# Patient Record
Sex: MALE | Race: BLACK OR AFRICAN AMERICAN | NOT HISPANIC OR LATINO | URBAN - METROPOLITAN AREA
[De-identification: names, ages, dates, MRNs, and addresses within clinical notes are randomized per-mention and may not be internally consistent; named-entity substitution may affect disease eponyms.]

---

## 2018-01-01 ENCOUNTER — INPATIENT (INPATIENT)
Facility: HOSPITAL | Age: 48
LOS: 6 days | DRG: 283 | End: 2018-06-29
Attending: STUDENT IN AN ORGANIZED HEALTH CARE EDUCATION/TRAINING PROGRAM | Admitting: INTERNAL MEDICINE
Payer: MEDICAID

## 2018-01-01 VITALS
OXYGEN SATURATION: 94 % | DIASTOLIC BLOOD PRESSURE: 99 MMHG | RESPIRATION RATE: 25 BRPM | SYSTOLIC BLOOD PRESSURE: 154 MMHG

## 2018-01-01 DIAGNOSIS — Z78.9 OTHER SPECIFIED HEALTH STATUS: Chronic | ICD-10-CM

## 2018-01-01 DIAGNOSIS — G93.1 ANOXIC BRAIN DAMAGE, NOT ELSEWHERE CLASSIFIED: ICD-10-CM

## 2018-01-01 DIAGNOSIS — Z71.89 OTHER SPECIFIED COUNSELING: ICD-10-CM

## 2018-01-01 DIAGNOSIS — I46.9 CARDIAC ARREST, CAUSE UNSPECIFIED: ICD-10-CM

## 2018-01-01 DIAGNOSIS — Z51.5 ENCOUNTER FOR PALLIATIVE CARE: ICD-10-CM

## 2018-01-01 LAB
ALBUMIN SERPL ELPH-MCNC: 3.3 G/DL — SIGNIFICANT CHANGE UP (ref 3.3–5)
ALBUMIN SERPL ELPH-MCNC: 3.4 G/DL — SIGNIFICANT CHANGE UP (ref 3.3–5)
ALBUMIN SERPL ELPH-MCNC: 3.5 G/DL — SIGNIFICANT CHANGE UP (ref 3.3–5)
ALBUMIN SERPL ELPH-MCNC: 3.6 G/DL — SIGNIFICANT CHANGE UP (ref 3.3–5)
ALBUMIN SERPL ELPH-MCNC: 3.7 G/DL — SIGNIFICANT CHANGE UP (ref 3.3–5)
ALBUMIN SERPL ELPH-MCNC: 3.8 G/DL — SIGNIFICANT CHANGE UP (ref 3.3–5)
ALBUMIN SERPL ELPH-MCNC: 3.8 G/DL — SIGNIFICANT CHANGE UP (ref 3.3–5)
ALBUMIN SERPL ELPH-MCNC: 3.9 G/DL — SIGNIFICANT CHANGE UP (ref 3.3–5)
ALBUMIN SERPL ELPH-MCNC: 3.9 G/DL — SIGNIFICANT CHANGE UP (ref 3.3–5)
ALBUMIN SERPL ELPH-MCNC: 4.1 G/DL — SIGNIFICANT CHANGE UP (ref 3.3–5)
ALBUMIN SERPL ELPH-MCNC: 4.3 G/DL — SIGNIFICANT CHANGE UP (ref 3.3–5)
ALP SERPL-CCNC: 103 U/L — SIGNIFICANT CHANGE UP (ref 40–120)
ALP SERPL-CCNC: 107 U/L — SIGNIFICANT CHANGE UP (ref 40–120)
ALP SERPL-CCNC: 73 U/L — SIGNIFICANT CHANGE UP (ref 40–120)
ALP SERPL-CCNC: 74 U/L — SIGNIFICANT CHANGE UP (ref 40–120)
ALP SERPL-CCNC: 75 U/L — SIGNIFICANT CHANGE UP (ref 40–120)
ALP SERPL-CCNC: 77 U/L — SIGNIFICANT CHANGE UP (ref 40–120)
ALP SERPL-CCNC: 77 U/L — SIGNIFICANT CHANGE UP (ref 40–120)
ALP SERPL-CCNC: 78 U/L — SIGNIFICANT CHANGE UP (ref 40–120)
ALP SERPL-CCNC: 81 U/L — SIGNIFICANT CHANGE UP (ref 40–120)
ALP SERPL-CCNC: 84 U/L — SIGNIFICANT CHANGE UP (ref 40–120)
ALP SERPL-CCNC: 87 U/L — SIGNIFICANT CHANGE UP (ref 40–120)
ALP SERPL-CCNC: 87 U/L — SIGNIFICANT CHANGE UP (ref 40–120)
ALP SERPL-CCNC: 91 U/L — SIGNIFICANT CHANGE UP (ref 40–120)
ALP SERPL-CCNC: 95 U/L — SIGNIFICANT CHANGE UP (ref 40–120)
ALP SERPL-CCNC: 96 U/L — SIGNIFICANT CHANGE UP (ref 40–120)
ALP SERPL-CCNC: 99 U/L — SIGNIFICANT CHANGE UP (ref 40–120)
ALT FLD-CCNC: 54 U/L — HIGH (ref 10–45)
ALT FLD-CCNC: 60 U/L — HIGH (ref 10–45)
ALT FLD-CCNC: 61 U/L — HIGH (ref 10–45)
ALT FLD-CCNC: 61 U/L — HIGH (ref 10–45)
ALT FLD-CCNC: 63 U/L — HIGH (ref 10–45)
ALT FLD-CCNC: 63 U/L — HIGH (ref 10–45)
ALT FLD-CCNC: 72 U/L — HIGH (ref 10–45)
ALT FLD-CCNC: 79 U/L — HIGH (ref 10–45)
ALT FLD-CCNC: 79 U/L — HIGH (ref 10–45)
ALT FLD-CCNC: 81 U/L — HIGH (ref 10–45)
ALT FLD-CCNC: 82 U/L — HIGH (ref 10–45)
ALT FLD-CCNC: 83 U/L — HIGH (ref 10–45)
ALT FLD-CCNC: 90 U/L — HIGH (ref 10–45)
ALT FLD-CCNC: 91 U/L — HIGH (ref 10–45)
ALT FLD-CCNC: 94 U/L — HIGH (ref 10–45)
ALT FLD-CCNC: 99 U/L — HIGH (ref 10–45)
AMPHET UR-MCNC: NEGATIVE — SIGNIFICANT CHANGE UP
AMYLASE P1 CFR SERPL: 153 U/L — HIGH (ref 25–125)
AMYLASE P1 CFR SERPL: 382 U/L — HIGH (ref 25–125)
AMYLASE P1 CFR SERPL: 97 U/L — SIGNIFICANT CHANGE UP (ref 25–125)
ANION GAP SERPL CALC-SCNC: 12 MMOL/L — SIGNIFICANT CHANGE UP (ref 5–17)
ANION GAP SERPL CALC-SCNC: 13 MMOL/L — SIGNIFICANT CHANGE UP (ref 5–17)
ANION GAP SERPL CALC-SCNC: 14 MMOL/L — SIGNIFICANT CHANGE UP (ref 5–17)
ANION GAP SERPL CALC-SCNC: 15 MMOL/L — SIGNIFICANT CHANGE UP (ref 5–17)
ANION GAP SERPL CALC-SCNC: 16 MMOL/L — SIGNIFICANT CHANGE UP (ref 5–17)
APPEARANCE UR: CLEAR — SIGNIFICANT CHANGE UP
APTT BLD: 28.8 SEC — SIGNIFICANT CHANGE UP (ref 27.5–37.4)
APTT BLD: 47.2 SEC — HIGH (ref 27.5–37.4)
APTT BLD: 51.8 SEC — HIGH (ref 27.5–37.4)
APTT BLD: 53.2 SEC — HIGH (ref 27.5–37.4)
APTT BLD: 55.5 SEC — HIGH (ref 27.5–37.4)
APTT BLD: 58.1 SEC — HIGH (ref 27.5–37.4)
APTT BLD: 61.1 SEC — HIGH (ref 27.5–37.4)
APTT BLD: 75.4 SEC — HIGH (ref 27.5–37.4)
APTT BLD: 82.2 SEC — HIGH (ref 27.5–37.4)
AST SERPL-CCNC: 103 U/L — HIGH (ref 10–40)
AST SERPL-CCNC: 104 U/L — HIGH (ref 10–40)
AST SERPL-CCNC: 104 U/L — HIGH (ref 10–40)
AST SERPL-CCNC: 108 U/L — HIGH (ref 10–40)
AST SERPL-CCNC: 111 U/L — HIGH (ref 10–40)
AST SERPL-CCNC: 140 U/L — HIGH (ref 10–40)
AST SERPL-CCNC: 147 U/L — HIGH (ref 10–40)
AST SERPL-CCNC: 165 U/L — HIGH (ref 10–40)
AST SERPL-CCNC: 64 U/L — HIGH (ref 10–40)
AST SERPL-CCNC: 72 U/L — HIGH (ref 10–40)
AST SERPL-CCNC: 75 U/L — HIGH (ref 10–40)
AST SERPL-CCNC: 77 U/L — HIGH (ref 10–40)
AST SERPL-CCNC: 79 U/L — HIGH (ref 10–40)
AST SERPL-CCNC: 81 U/L — HIGH (ref 10–40)
AST SERPL-CCNC: 91 U/L — HIGH (ref 10–40)
AST SERPL-CCNC: 96 U/L — HIGH (ref 10–40)
BARBITURATES, URINE.: NEGATIVE — SIGNIFICANT CHANGE UP
BASOPHILS # BLD AUTO: 0 K/UL — SIGNIFICANT CHANGE UP (ref 0–0.2)
BASOPHILS # BLD AUTO: 0.1 K/UL — SIGNIFICANT CHANGE UP (ref 0–0.2)
BASOPHILS NFR BLD AUTO: 0.1 % — SIGNIFICANT CHANGE UP (ref 0–2)
BASOPHILS NFR BLD AUTO: 0.2 % — SIGNIFICANT CHANGE UP (ref 0–2)
BASOPHILS NFR BLD AUTO: 0.3 % — SIGNIFICANT CHANGE UP (ref 0–2)
BASOPHILS NFR BLD AUTO: 0.5 % — SIGNIFICANT CHANGE UP (ref 0–2)
BENZODIAZ UR-MCNC: NEGATIVE — SIGNIFICANT CHANGE UP
BILIRUB SERPL-MCNC: 0.1 MG/DL — LOW (ref 0.2–1.2)
BILIRUB SERPL-MCNC: 0.2 MG/DL — SIGNIFICANT CHANGE UP (ref 0.2–1.2)
BILIRUB SERPL-MCNC: 0.3 MG/DL — SIGNIFICANT CHANGE UP (ref 0.2–1.2)
BILIRUB SERPL-MCNC: 0.4 MG/DL — SIGNIFICANT CHANGE UP (ref 0.2–1.2)
BILIRUB UR-MCNC: NEGATIVE — SIGNIFICANT CHANGE UP
BLD GP AB SCN SERPL QL: NEGATIVE — SIGNIFICANT CHANGE UP
BUN SERPL-MCNC: 15 MG/DL — SIGNIFICANT CHANGE UP (ref 7–23)
BUN SERPL-MCNC: 17 MG/DL — SIGNIFICANT CHANGE UP (ref 7–23)
BUN SERPL-MCNC: 17 MG/DL — SIGNIFICANT CHANGE UP (ref 7–23)
BUN SERPL-MCNC: 18 MG/DL — SIGNIFICANT CHANGE UP (ref 7–23)
BUN SERPL-MCNC: 19 MG/DL — SIGNIFICANT CHANGE UP (ref 7–23)
BUN SERPL-MCNC: 20 MG/DL — SIGNIFICANT CHANGE UP (ref 7–23)
BUN SERPL-MCNC: 20 MG/DL — SIGNIFICANT CHANGE UP (ref 7–23)
BUN SERPL-MCNC: 22 MG/DL — SIGNIFICANT CHANGE UP (ref 7–23)
BUN SERPL-MCNC: 24 MG/DL — HIGH (ref 7–23)
BUN SERPL-MCNC: 25 MG/DL — HIGH (ref 7–23)
BUN SERPL-MCNC: 28 MG/DL — HIGH (ref 7–23)
BUN SERPL-MCNC: 37 MG/DL — HIGH (ref 7–23)
BUN SERPL-MCNC: 41 MG/DL — HIGH (ref 7–23)
CALCIUM SERPL-MCNC: 7.5 MG/DL — LOW (ref 8.4–10.5)
CALCIUM SERPL-MCNC: 8 MG/DL — LOW (ref 8.4–10.5)
CALCIUM SERPL-MCNC: 8.1 MG/DL — LOW (ref 8.4–10.5)
CALCIUM SERPL-MCNC: 8.2 MG/DL — LOW (ref 8.4–10.5)
CALCIUM SERPL-MCNC: 8.4 MG/DL — SIGNIFICANT CHANGE UP (ref 8.4–10.5)
CALCIUM SERPL-MCNC: 8.5 MG/DL — SIGNIFICANT CHANGE UP (ref 8.4–10.5)
CALCIUM SERPL-MCNC: 8.6 MG/DL — SIGNIFICANT CHANGE UP (ref 8.4–10.5)
CALCIUM SERPL-MCNC: 9.2 MG/DL — SIGNIFICANT CHANGE UP (ref 8.4–10.5)
CALCIUM SERPL-MCNC: 9.2 MG/DL — SIGNIFICANT CHANGE UP (ref 8.4–10.5)
CALCIUM SERPL-MCNC: 9.5 MG/DL — SIGNIFICANT CHANGE UP (ref 8.4–10.5)
CALCIUM SERPL-MCNC: 9.6 MG/DL — SIGNIFICANT CHANGE UP (ref 8.4–10.5)
CALCIUM SERPL-MCNC: 9.6 MG/DL — SIGNIFICANT CHANGE UP (ref 8.4–10.5)
CHLORIDE SERPL-SCNC: 100 MMOL/L — SIGNIFICANT CHANGE UP (ref 96–108)
CHLORIDE SERPL-SCNC: 101 MMOL/L — SIGNIFICANT CHANGE UP (ref 96–108)
CHLORIDE SERPL-SCNC: 102 MMOL/L — SIGNIFICANT CHANGE UP (ref 96–108)
CHLORIDE SERPL-SCNC: 103 MMOL/L — SIGNIFICANT CHANGE UP (ref 96–108)
CHLORIDE SERPL-SCNC: 104 MMOL/L — SIGNIFICANT CHANGE UP (ref 96–108)
CHLORIDE SERPL-SCNC: 105 MMOL/L — SIGNIFICANT CHANGE UP (ref 96–108)
CHLORIDE SERPL-SCNC: 109 MMOL/L — HIGH (ref 96–108)
CHOLEST SERPL-MCNC: 134 MG/DL — SIGNIFICANT CHANGE UP (ref 10–199)
CK MB BLD-MCNC: 1.4 % — SIGNIFICANT CHANGE UP (ref 0–3.5)
CK MB BLD-MCNC: 1.6 % — SIGNIFICANT CHANGE UP (ref 0–3.5)
CK MB BLD-MCNC: 1.9 % — SIGNIFICANT CHANGE UP (ref 0–3.5)
CK MB BLD-MCNC: 2.1 % — SIGNIFICANT CHANGE UP (ref 0–3.5)
CK MB CFR SERPL CALC: 52.7 NG/ML — HIGH (ref 0–6.7)
CK MB CFR SERPL CALC: 6.3 NG/ML — SIGNIFICANT CHANGE UP (ref 0–6.7)
CK MB CFR SERPL CALC: 61.7 NG/ML — HIGH (ref 0–6.7)
CK MB CFR SERPL CALC: 65.5 NG/ML — HIGH (ref 0–6.7)
CK SERPL-CCNC: 2970 U/L — HIGH (ref 30–200)
CK SERPL-CCNC: 3290 U/L — HIGH (ref 30–200)
CK SERPL-CCNC: 3386 U/L — HIGH (ref 30–200)
CK SERPL-CCNC: 445 U/L — HIGH (ref 30–200)
CO2 SERPL-SCNC: 23 MMOL/L — SIGNIFICANT CHANGE UP (ref 22–31)
CO2 SERPL-SCNC: 24 MMOL/L — SIGNIFICANT CHANGE UP (ref 22–31)
CO2 SERPL-SCNC: 25 MMOL/L — SIGNIFICANT CHANGE UP (ref 22–31)
CO2 SERPL-SCNC: 26 MMOL/L — SIGNIFICANT CHANGE UP (ref 22–31)
CO2 SERPL-SCNC: 27 MMOL/L — SIGNIFICANT CHANGE UP (ref 22–31)
CO2 SERPL-SCNC: 27 MMOL/L — SIGNIFICANT CHANGE UP (ref 22–31)
CO2 SERPL-SCNC: 29 MMOL/L — SIGNIFICANT CHANGE UP (ref 22–31)
CO2 SERPL-SCNC: 30 MMOL/L — SIGNIFICANT CHANGE UP (ref 22–31)
COCAINE METAB.OTHER UR-MCNC: NEGATIVE — SIGNIFICANT CHANGE UP
COLOR SPEC: YELLOW — SIGNIFICANT CHANGE UP
CREAT SERPL-MCNC: 0.93 MG/DL — SIGNIFICANT CHANGE UP (ref 0.5–1.3)
CREAT SERPL-MCNC: 0.95 MG/DL — SIGNIFICANT CHANGE UP (ref 0.5–1.3)
CREAT SERPL-MCNC: 1 MG/DL — SIGNIFICANT CHANGE UP (ref 0.5–1.3)
CREAT SERPL-MCNC: 1.04 MG/DL — SIGNIFICANT CHANGE UP (ref 0.5–1.3)
CREAT SERPL-MCNC: 1.08 MG/DL — SIGNIFICANT CHANGE UP (ref 0.5–1.3)
CREAT SERPL-MCNC: 1.11 MG/DL — SIGNIFICANT CHANGE UP (ref 0.5–1.3)
CREAT SERPL-MCNC: 1.13 MG/DL — SIGNIFICANT CHANGE UP (ref 0.5–1.3)
CREAT SERPL-MCNC: 1.16 MG/DL — SIGNIFICANT CHANGE UP (ref 0.5–1.3)
CREAT SERPL-MCNC: 1.17 MG/DL — SIGNIFICANT CHANGE UP (ref 0.5–1.3)
CREAT SERPL-MCNC: 1.24 MG/DL — SIGNIFICANT CHANGE UP (ref 0.5–1.3)
CREAT SERPL-MCNC: 1.27 MG/DL — SIGNIFICANT CHANGE UP (ref 0.5–1.3)
CREAT SERPL-MCNC: 1.31 MG/DL — HIGH (ref 0.5–1.3)
CREAT SERPL-MCNC: 1.52 MG/DL — HIGH (ref 0.5–1.3)
CREAT SERPL-MCNC: 1.54 MG/DL — HIGH (ref 0.5–1.3)
CREATININE, URINE THERAPEUTIC: 9.9 MG/DL — LOW
CULTURE RESULTS: NO GROWTH — SIGNIFICANT CHANGE UP
CULTURE RESULTS: NO GROWTH — SIGNIFICANT CHANGE UP
CULTURE RESULTS: SIGNIFICANT CHANGE UP
CULTURE RESULTS: SIGNIFICANT CHANGE UP
DIFF PNL FLD: NEGATIVE — SIGNIFICANT CHANGE UP
EOSINOPHIL # BLD AUTO: 0 K/UL — SIGNIFICANT CHANGE UP (ref 0–0.5)
EOSINOPHIL # BLD AUTO: 0.1 K/UL — SIGNIFICANT CHANGE UP (ref 0–0.5)
EOSINOPHIL # BLD AUTO: 0.1 K/UL — SIGNIFICANT CHANGE UP (ref 0–0.5)
EOSINOPHIL NFR BLD AUTO: 0 % — SIGNIFICANT CHANGE UP (ref 0–6)
EOSINOPHIL NFR BLD AUTO: 0.1 % — SIGNIFICANT CHANGE UP (ref 0–6)
EOSINOPHIL NFR BLD AUTO: 0.1 % — SIGNIFICANT CHANGE UP (ref 0–6)
EOSINOPHIL NFR BLD AUTO: 0.2 % — SIGNIFICANT CHANGE UP (ref 0–6)
EOSINOPHIL NFR BLD AUTO: 0.2 % — SIGNIFICANT CHANGE UP (ref 0–6)
EOSINOPHIL NFR BLD AUTO: 0.6 % — SIGNIFICANT CHANGE UP (ref 0–6)
FIBRINOGEN PPP-MCNC: 684 MG/DL — HIGH (ref 310–510)
FIBRINOGEN PPP-MCNC: 934 MG/DL — HIGH (ref 310–510)
FIBRINOGEN PPP-MCNC: 970 MG/DL — HIGH (ref 310–510)
FIBRINOGEN PPP-MCNC: 984 MG/DL — HIGH (ref 310–510)
GAS PNL BLDA: SIGNIFICANT CHANGE UP
GLUCOSE SERPL-MCNC: 106 MG/DL — HIGH (ref 70–99)
GLUCOSE SERPL-MCNC: 112 MG/DL — HIGH (ref 70–99)
GLUCOSE SERPL-MCNC: 113 MG/DL — HIGH (ref 70–99)
GLUCOSE SERPL-MCNC: 115 MG/DL — HIGH (ref 70–99)
GLUCOSE SERPL-MCNC: 117 MG/DL — HIGH (ref 70–99)
GLUCOSE SERPL-MCNC: 120 MG/DL — HIGH (ref 70–99)
GLUCOSE SERPL-MCNC: 122 MG/DL — HIGH (ref 70–99)
GLUCOSE SERPL-MCNC: 133 MG/DL — HIGH (ref 70–99)
GLUCOSE SERPL-MCNC: 136 MG/DL — HIGH (ref 70–99)
GLUCOSE SERPL-MCNC: 140 MG/DL — HIGH (ref 70–99)
GLUCOSE SERPL-MCNC: 151 MG/DL — HIGH (ref 70–99)
GLUCOSE SERPL-MCNC: 154 MG/DL — HIGH (ref 70–99)
GLUCOSE SERPL-MCNC: 165 MG/DL — HIGH (ref 70–99)
GLUCOSE SERPL-MCNC: 166 MG/DL — HIGH (ref 70–99)
GLUCOSE SERPL-MCNC: 204 MG/DL — HIGH (ref 70–99)
GLUCOSE SERPL-MCNC: 221 MG/DL — HIGH (ref 70–99)
GLUCOSE UR QL: NEGATIVE — SIGNIFICANT CHANGE UP
HBA1C BLD-MCNC: 6.3 % — HIGH (ref 4–5.6)
HCT VFR BLD CALC: 37.4 % — LOW (ref 39–50)
HCT VFR BLD CALC: 38.3 % — LOW (ref 39–50)
HCT VFR BLD CALC: 39.5 % — SIGNIFICANT CHANGE UP (ref 39–50)
HCT VFR BLD CALC: 39.7 % — SIGNIFICANT CHANGE UP (ref 39–50)
HCT VFR BLD CALC: 40.1 % — SIGNIFICANT CHANGE UP (ref 39–50)
HCT VFR BLD CALC: 40.3 % — SIGNIFICANT CHANGE UP (ref 39–50)
HCT VFR BLD CALC: 40.6 % — SIGNIFICANT CHANGE UP (ref 39–50)
HCT VFR BLD CALC: 40.7 % — SIGNIFICANT CHANGE UP (ref 39–50)
HCT VFR BLD CALC: 41.1 % — SIGNIFICANT CHANGE UP (ref 39–50)
HCT VFR BLD CALC: 41.3 % — SIGNIFICANT CHANGE UP (ref 39–50)
HCT VFR BLD CALC: 41.6 % — SIGNIFICANT CHANGE UP (ref 39–50)
HCT VFR BLD CALC: 42.7 % — SIGNIFICANT CHANGE UP (ref 39–50)
HCT VFR BLD CALC: 42.7 % — SIGNIFICANT CHANGE UP (ref 39–50)
HCT VFR BLD CALC: 43.3 % — SIGNIFICANT CHANGE UP (ref 39–50)
HDLC SERPL-MCNC: 45 MG/DL — SIGNIFICANT CHANGE UP (ref 40–125)
HGB BLD-MCNC: 11.9 G/DL — LOW (ref 13–17)
HGB BLD-MCNC: 12.6 G/DL — LOW (ref 13–17)
HGB BLD-MCNC: 13 G/DL — SIGNIFICANT CHANGE UP (ref 13–17)
HGB BLD-MCNC: 13.1 G/DL — SIGNIFICANT CHANGE UP (ref 13–17)
HGB BLD-MCNC: 13.1 G/DL — SIGNIFICANT CHANGE UP (ref 13–17)
HGB BLD-MCNC: 13.2 G/DL — SIGNIFICANT CHANGE UP (ref 13–17)
HGB BLD-MCNC: 13.5 G/DL — SIGNIFICANT CHANGE UP (ref 13–17)
HGB BLD-MCNC: 13.6 G/DL — SIGNIFICANT CHANGE UP (ref 13–17)
HGB BLD-MCNC: 13.6 G/DL — SIGNIFICANT CHANGE UP (ref 13–17)
HGB BLD-MCNC: 13.7 G/DL — SIGNIFICANT CHANGE UP (ref 13–17)
HGB BLD-MCNC: 13.8 G/DL — SIGNIFICANT CHANGE UP (ref 13–17)
HGB BLD-MCNC: 13.9 G/DL — SIGNIFICANT CHANGE UP (ref 13–17)
INR BLD: 0.96 RATIO — SIGNIFICANT CHANGE UP (ref 0.88–1.16)
INR BLD: 1.01 RATIO — SIGNIFICANT CHANGE UP (ref 0.88–1.16)
INR BLD: 1.05 RATIO — SIGNIFICANT CHANGE UP (ref 0.88–1.16)
INR BLD: 1.07 RATIO — SIGNIFICANT CHANGE UP (ref 0.88–1.16)
INR BLD: 1.07 RATIO — SIGNIFICANT CHANGE UP (ref 0.88–1.16)
INR BLD: 1.08 RATIO — SIGNIFICANT CHANGE UP (ref 0.88–1.16)
INR BLD: 1.09 RATIO — SIGNIFICANT CHANGE UP (ref 0.88–1.16)
INR BLD: 1.1 RATIO — SIGNIFICANT CHANGE UP (ref 0.88–1.16)
INR BLD: 1.1 RATIO — SIGNIFICANT CHANGE UP (ref 0.88–1.16)
INR BLD: 1.11 RATIO — SIGNIFICANT CHANGE UP (ref 0.88–1.16)
INR BLD: 1.11 RATIO — SIGNIFICANT CHANGE UP (ref 0.88–1.16)
KETONES UR-MCNC: NEGATIVE — SIGNIFICANT CHANGE UP
LEUKOCYTE ESTERASE UR-ACNC: NEGATIVE — SIGNIFICANT CHANGE UP
LIPID PNL WITH DIRECT LDL SERPL: SIGNIFICANT CHANGE UP
LYMPHOCYTES # BLD AUTO: 1 K/UL — SIGNIFICANT CHANGE UP (ref 1–3.3)
LYMPHOCYTES # BLD AUTO: 1 K/UL — SIGNIFICANT CHANGE UP (ref 1–3.3)
LYMPHOCYTES # BLD AUTO: 1.2 K/UL — SIGNIFICANT CHANGE UP (ref 1–3.3)
LYMPHOCYTES # BLD AUTO: 1.3 K/UL — SIGNIFICANT CHANGE UP (ref 1–3.3)
LYMPHOCYTES # BLD AUTO: 1.6 K/UL — SIGNIFICANT CHANGE UP (ref 1–3.3)
LYMPHOCYTES # BLD AUTO: 1.6 K/UL — SIGNIFICANT CHANGE UP (ref 1–3.3)
LYMPHOCYTES # BLD AUTO: 10.8 % — LOW (ref 13–44)
LYMPHOCYTES # BLD AUTO: 10.9 % — LOW (ref 13–44)
LYMPHOCYTES # BLD AUTO: 15.6 % — SIGNIFICANT CHANGE UP (ref 13–44)
LYMPHOCYTES # BLD AUTO: 18 % — SIGNIFICANT CHANGE UP (ref 13–44)
LYMPHOCYTES # BLD AUTO: 2.2 K/UL — SIGNIFICANT CHANGE UP (ref 1–3.3)
LYMPHOCYTES # BLD AUTO: 2.4 K/UL — SIGNIFICANT CHANGE UP (ref 1–3.3)
LYMPHOCYTES # BLD AUTO: 4 % — LOW (ref 13–44)
LYMPHOCYTES # BLD AUTO: 8.6 % — LOW (ref 13–44)
LYMPHOCYTES # BLD AUTO: 8.8 % — LOW (ref 13–44)
LYMPHOCYTES # BLD AUTO: 9.2 % — LOW (ref 13–44)
MAGNESIUM SERPL-MCNC: 1.9 MG/DL — SIGNIFICANT CHANGE UP (ref 1.6–2.6)
MAGNESIUM SERPL-MCNC: 2 MG/DL — SIGNIFICANT CHANGE UP (ref 1.6–2.6)
MAGNESIUM SERPL-MCNC: 2 MG/DL — SIGNIFICANT CHANGE UP (ref 1.6–2.6)
MAGNESIUM SERPL-MCNC: 2.1 MG/DL — SIGNIFICANT CHANGE UP (ref 1.6–2.6)
MAGNESIUM SERPL-MCNC: 2.2 MG/DL — SIGNIFICANT CHANGE UP (ref 1.6–2.6)
MAGNESIUM SERPL-MCNC: 2.4 MG/DL — SIGNIFICANT CHANGE UP (ref 1.6–2.6)
MAGNESIUM SERPL-MCNC: 2.5 MG/DL — SIGNIFICANT CHANGE UP (ref 1.6–2.6)
MAGNESIUM SERPL-MCNC: 2.6 MG/DL — SIGNIFICANT CHANGE UP (ref 1.6–2.6)
MCHC RBC-ENTMCNC: 26.7 PG — LOW (ref 27–34)
MCHC RBC-ENTMCNC: 26.9 PG — LOW (ref 27–34)
MCHC RBC-ENTMCNC: 27 PG — SIGNIFICANT CHANGE UP (ref 27–34)
MCHC RBC-ENTMCNC: 27.1 PG — SIGNIFICANT CHANGE UP (ref 27–34)
MCHC RBC-ENTMCNC: 27.2 PG — SIGNIFICANT CHANGE UP (ref 27–34)
MCHC RBC-ENTMCNC: 27.5 PG — SIGNIFICANT CHANGE UP (ref 27–34)
MCHC RBC-ENTMCNC: 27.5 PG — SIGNIFICANT CHANGE UP (ref 27–34)
MCHC RBC-ENTMCNC: 27.7 PG — SIGNIFICANT CHANGE UP (ref 27–34)
MCHC RBC-ENTMCNC: 27.8 PG — SIGNIFICANT CHANGE UP (ref 27–34)
MCHC RBC-ENTMCNC: 27.9 PG — SIGNIFICANT CHANGE UP (ref 27–34)
MCHC RBC-ENTMCNC: 31.9 GM/DL — LOW (ref 32–36)
MCHC RBC-ENTMCNC: 31.9 GM/DL — LOW (ref 32–36)
MCHC RBC-ENTMCNC: 32.1 GM/DL — SIGNIFICANT CHANGE UP (ref 32–36)
MCHC RBC-ENTMCNC: 32.6 GM/DL — SIGNIFICANT CHANGE UP (ref 32–36)
MCHC RBC-ENTMCNC: 32.7 GM/DL — SIGNIFICANT CHANGE UP (ref 32–36)
MCHC RBC-ENTMCNC: 32.7 GM/DL — SIGNIFICANT CHANGE UP (ref 32–36)
MCHC RBC-ENTMCNC: 32.8 GM/DL — SIGNIFICANT CHANGE UP (ref 32–36)
MCHC RBC-ENTMCNC: 32.9 GM/DL — SIGNIFICANT CHANGE UP (ref 32–36)
MCHC RBC-ENTMCNC: 33.1 GM/DL — SIGNIFICANT CHANGE UP (ref 32–36)
MCHC RBC-ENTMCNC: 33.2 GM/DL — SIGNIFICANT CHANGE UP (ref 32–36)
MCHC RBC-ENTMCNC: 33.2 GM/DL — SIGNIFICANT CHANGE UP (ref 32–36)
MCHC RBC-ENTMCNC: 33.3 GM/DL — SIGNIFICANT CHANGE UP (ref 32–36)
MCV RBC AUTO: 81.8 FL — SIGNIFICANT CHANGE UP (ref 80–100)
MCV RBC AUTO: 82.5 FL — SIGNIFICANT CHANGE UP (ref 80–100)
MCV RBC AUTO: 83 FL — SIGNIFICANT CHANGE UP (ref 80–100)
MCV RBC AUTO: 83 FL — SIGNIFICANT CHANGE UP (ref 80–100)
MCV RBC AUTO: 83.1 FL — SIGNIFICANT CHANGE UP (ref 80–100)
MCV RBC AUTO: 83.1 FL — SIGNIFICANT CHANGE UP (ref 80–100)
MCV RBC AUTO: 83.3 FL — SIGNIFICANT CHANGE UP (ref 80–100)
MCV RBC AUTO: 83.6 FL — SIGNIFICANT CHANGE UP (ref 80–100)
MCV RBC AUTO: 83.7 FL — SIGNIFICANT CHANGE UP (ref 80–100)
MCV RBC AUTO: 83.7 FL — SIGNIFICANT CHANGE UP (ref 80–100)
MCV RBC AUTO: 83.9 FL — SIGNIFICANT CHANGE UP (ref 80–100)
MCV RBC AUTO: 84.2 FL — SIGNIFICANT CHANGE UP (ref 80–100)
MCV RBC AUTO: 84.4 FL — SIGNIFICANT CHANGE UP (ref 80–100)
MCV RBC AUTO: 84.7 FL — SIGNIFICANT CHANGE UP (ref 80–100)
METHADONE UR-MCNC: NEGATIVE — SIGNIFICANT CHANGE UP
METHAQUALONE UR QL: NEGATIVE — SIGNIFICANT CHANGE UP
METHAQUALONE UR-MCNC: NEGATIVE — SIGNIFICANT CHANGE UP
MONOCYTES # BLD AUTO: 0.7 K/UL — SIGNIFICANT CHANGE UP (ref 0–0.9)
MONOCYTES # BLD AUTO: 0.9 K/UL — SIGNIFICANT CHANGE UP (ref 0–0.9)
MONOCYTES # BLD AUTO: 1 K/UL — HIGH (ref 0–0.9)
MONOCYTES # BLD AUTO: 1.2 K/UL — HIGH (ref 0–0.9)
MONOCYTES # BLD AUTO: 1.3 K/UL — HIGH (ref 0–0.9)
MONOCYTES # BLD AUTO: 1.8 K/UL — HIGH (ref 0–0.9)
MONOCYTES # BLD AUTO: 1.9 K/UL — HIGH (ref 0–0.9)
MONOCYTES # BLD AUTO: 2 K/UL — HIGH (ref 0–0.9)
MONOCYTES NFR BLD AUTO: 12.1 % — SIGNIFICANT CHANGE UP (ref 2–14)
MONOCYTES NFR BLD AUTO: 13.9 % — SIGNIFICANT CHANGE UP (ref 2–14)
MONOCYTES NFR BLD AUTO: 4 % — SIGNIFICANT CHANGE UP (ref 2–14)
MONOCYTES NFR BLD AUTO: 5.6 % — SIGNIFICANT CHANGE UP (ref 2–14)
MONOCYTES NFR BLD AUTO: 6.2 % — SIGNIFICANT CHANGE UP (ref 2–14)
MONOCYTES NFR BLD AUTO: 6.9 % — SIGNIFICANT CHANGE UP (ref 2–14)
MONOCYTES NFR BLD AUTO: 9 % — SIGNIFICANT CHANGE UP (ref 2–14)
MONOCYTES NFR BLD AUTO: 9.5 % — SIGNIFICANT CHANGE UP (ref 2–14)
MYOGLOBIN SERPL-MCNC: 101 NG/ML — HIGH (ref 16–96)
MYOGLOBIN SERPL-MCNC: 135 NG/ML — HIGH (ref 16–96)
MYOGLOBIN SERPL-MCNC: 221 NG/ML — HIGH (ref 16–96)
MYOGLOBIN SERPL-MCNC: 88 NG/ML — SIGNIFICANT CHANGE UP (ref 16–96)
MYOGLOBIN SERPL-MCNC: 92 NG/ML — SIGNIFICANT CHANGE UP (ref 16–96)
NEUTROPHILS # BLD AUTO: 10.2 K/UL — HIGH (ref 1.8–7.4)
NEUTROPHILS # BLD AUTO: 11.3 K/UL — HIGH (ref 1.8–7.4)
NEUTROPHILS # BLD AUTO: 11.4 K/UL — HIGH (ref 1.8–7.4)
NEUTROPHILS # BLD AUTO: 11.5 K/UL — HIGH (ref 1.8–7.4)
NEUTROPHILS # BLD AUTO: 11.9 K/UL — HIGH (ref 1.8–7.4)
NEUTROPHILS # BLD AUTO: 12 K/UL — HIGH (ref 1.8–7.4)
NEUTROPHILS # BLD AUTO: 12.1 K/UL — HIGH (ref 1.8–7.4)
NEUTROPHILS # BLD AUTO: 9.9 K/UL — HIGH (ref 1.8–7.4)
NEUTROPHILS NFR BLD AUTO: 69.9 % — SIGNIFICANT CHANGE UP (ref 43–77)
NEUTROPHILS NFR BLD AUTO: 71 % — SIGNIFICANT CHANGE UP (ref 43–77)
NEUTROPHILS NFR BLD AUTO: 76.8 % — SIGNIFICANT CHANGE UP (ref 43–77)
NEUTROPHILS NFR BLD AUTO: 81.2 % — HIGH (ref 43–77)
NEUTROPHILS NFR BLD AUTO: 81.8 % — HIGH (ref 43–77)
NEUTROPHILS NFR BLD AUTO: 84.3 % — HIGH (ref 43–77)
NEUTROPHILS NFR BLD AUTO: 85.7 % — HIGH (ref 43–77)
NEUTROPHILS NFR BLD AUTO: 90 % — HIGH (ref 43–77)
NITRITE UR-MCNC: NEGATIVE — SIGNIFICANT CHANGE UP
OPIATES UR-MCNC: NEGATIVE — SIGNIFICANT CHANGE UP
PCP UR-MCNC: NEGATIVE — SIGNIFICANT CHANGE UP
PH UR: 6 — SIGNIFICANT CHANGE UP (ref 5–8)
PHOSPHATE SERPL-MCNC: 2.8 MG/DL — SIGNIFICANT CHANGE UP (ref 2.5–4.5)
PHOSPHATE SERPL-MCNC: 2.8 MG/DL — SIGNIFICANT CHANGE UP (ref 2.5–4.5)
PHOSPHATE SERPL-MCNC: 3.1 MG/DL — SIGNIFICANT CHANGE UP (ref 2.5–4.5)
PHOSPHATE SERPL-MCNC: 3.2 MG/DL — SIGNIFICANT CHANGE UP (ref 2.5–4.5)
PHOSPHATE SERPL-MCNC: 3.3 MG/DL — SIGNIFICANT CHANGE UP (ref 2.5–4.5)
PHOSPHATE SERPL-MCNC: 3.5 MG/DL — SIGNIFICANT CHANGE UP (ref 2.5–4.5)
PHOSPHATE SERPL-MCNC: 3.6 MG/DL — SIGNIFICANT CHANGE UP (ref 2.5–4.5)
PHOSPHATE SERPL-MCNC: 3.6 MG/DL — SIGNIFICANT CHANGE UP (ref 2.5–4.5)
PHOSPHATE SERPL-MCNC: 3.7 MG/DL — SIGNIFICANT CHANGE UP (ref 2.5–4.5)
PHOSPHATE SERPL-MCNC: 3.7 MG/DL — SIGNIFICANT CHANGE UP (ref 2.5–4.5)
PHOSPHATE SERPL-MCNC: 3.9 MG/DL — SIGNIFICANT CHANGE UP (ref 2.5–4.5)
PHOSPHATE SERPL-MCNC: 3.9 MG/DL — SIGNIFICANT CHANGE UP (ref 2.5–4.5)
PHOSPHATE SERPL-MCNC: 5.4 MG/DL — HIGH (ref 2.5–4.5)
PHOSPHATE SERPL-MCNC: 5.9 MG/DL — HIGH (ref 2.5–4.5)
PLATELET # BLD AUTO: 261 K/UL — SIGNIFICANT CHANGE UP (ref 150–400)
PLATELET # BLD AUTO: 265 K/UL — SIGNIFICANT CHANGE UP (ref 150–400)
PLATELET # BLD AUTO: 273 K/UL — SIGNIFICANT CHANGE UP (ref 150–400)
PLATELET # BLD AUTO: 281 K/UL — SIGNIFICANT CHANGE UP (ref 150–400)
PLATELET # BLD AUTO: 283 K/UL — SIGNIFICANT CHANGE UP (ref 150–400)
PLATELET # BLD AUTO: 287 K/UL — SIGNIFICANT CHANGE UP (ref 150–400)
PLATELET # BLD AUTO: 289 K/UL — SIGNIFICANT CHANGE UP (ref 150–400)
PLATELET # BLD AUTO: 290 K/UL — SIGNIFICANT CHANGE UP (ref 150–400)
PLATELET # BLD AUTO: 296 K/UL — SIGNIFICANT CHANGE UP (ref 150–400)
PLATELET # BLD AUTO: 299 K/UL — SIGNIFICANT CHANGE UP (ref 150–400)
PLATELET # BLD AUTO: 301 K/UL — SIGNIFICANT CHANGE UP (ref 150–400)
PLATELET # BLD AUTO: 305 K/UL — SIGNIFICANT CHANGE UP (ref 150–400)
PLATELET # BLD AUTO: 324 K/UL — SIGNIFICANT CHANGE UP (ref 150–400)
PLATELET # BLD AUTO: 363 K/UL — SIGNIFICANT CHANGE UP (ref 150–400)
POTASSIUM SERPL-MCNC: 3.3 MMOL/L — LOW (ref 3.5–5.3)
POTASSIUM SERPL-MCNC: 3.4 MMOL/L — LOW (ref 3.5–5.3)
POTASSIUM SERPL-MCNC: 3.5 MMOL/L — SIGNIFICANT CHANGE UP (ref 3.5–5.3)
POTASSIUM SERPL-MCNC: 3.5 MMOL/L — SIGNIFICANT CHANGE UP (ref 3.5–5.3)
POTASSIUM SERPL-MCNC: 3.6 MMOL/L — SIGNIFICANT CHANGE UP (ref 3.5–5.3)
POTASSIUM SERPL-MCNC: 3.7 MMOL/L — SIGNIFICANT CHANGE UP (ref 3.5–5.3)
POTASSIUM SERPL-MCNC: 3.7 MMOL/L — SIGNIFICANT CHANGE UP (ref 3.5–5.3)
POTASSIUM SERPL-MCNC: 3.9 MMOL/L — SIGNIFICANT CHANGE UP (ref 3.5–5.3)
POTASSIUM SERPL-MCNC: 4 MMOL/L — SIGNIFICANT CHANGE UP (ref 3.5–5.3)
POTASSIUM SERPL-MCNC: 4.3 MMOL/L — SIGNIFICANT CHANGE UP (ref 3.5–5.3)
POTASSIUM SERPL-MCNC: 4.3 MMOL/L — SIGNIFICANT CHANGE UP (ref 3.5–5.3)
POTASSIUM SERPL-MCNC: 4.4 MMOL/L — SIGNIFICANT CHANGE UP (ref 3.5–5.3)
POTASSIUM SERPL-MCNC: 4.9 MMOL/L — SIGNIFICANT CHANGE UP (ref 3.5–5.3)
POTASSIUM SERPL-SCNC: 3.3 MMOL/L — LOW (ref 3.5–5.3)
POTASSIUM SERPL-SCNC: 3.4 MMOL/L — LOW (ref 3.5–5.3)
POTASSIUM SERPL-SCNC: 3.5 MMOL/L — SIGNIFICANT CHANGE UP (ref 3.5–5.3)
POTASSIUM SERPL-SCNC: 3.5 MMOL/L — SIGNIFICANT CHANGE UP (ref 3.5–5.3)
POTASSIUM SERPL-SCNC: 3.6 MMOL/L — SIGNIFICANT CHANGE UP (ref 3.5–5.3)
POTASSIUM SERPL-SCNC: 3.7 MMOL/L — SIGNIFICANT CHANGE UP (ref 3.5–5.3)
POTASSIUM SERPL-SCNC: 3.7 MMOL/L — SIGNIFICANT CHANGE UP (ref 3.5–5.3)
POTASSIUM SERPL-SCNC: 3.9 MMOL/L — SIGNIFICANT CHANGE UP (ref 3.5–5.3)
POTASSIUM SERPL-SCNC: 4 MMOL/L — SIGNIFICANT CHANGE UP (ref 3.5–5.3)
POTASSIUM SERPL-SCNC: 4.3 MMOL/L — SIGNIFICANT CHANGE UP (ref 3.5–5.3)
POTASSIUM SERPL-SCNC: 4.3 MMOL/L — SIGNIFICANT CHANGE UP (ref 3.5–5.3)
POTASSIUM SERPL-SCNC: 4.4 MMOL/L — SIGNIFICANT CHANGE UP (ref 3.5–5.3)
POTASSIUM SERPL-SCNC: 4.9 MMOL/L — SIGNIFICANT CHANGE UP (ref 3.5–5.3)
PROPOXYPH UR QL: NEGATIVE — SIGNIFICANT CHANGE UP
PROT SERPL-MCNC: 6.4 G/DL — SIGNIFICANT CHANGE UP (ref 6–8.3)
PROT SERPL-MCNC: 6.4 G/DL — SIGNIFICANT CHANGE UP (ref 6–8.3)
PROT SERPL-MCNC: 6.6 G/DL — SIGNIFICANT CHANGE UP (ref 6–8.3)
PROT SERPL-MCNC: 6.6 G/DL — SIGNIFICANT CHANGE UP (ref 6–8.3)
PROT SERPL-MCNC: 6.7 G/DL — SIGNIFICANT CHANGE UP (ref 6–8.3)
PROT SERPL-MCNC: 6.8 G/DL — SIGNIFICANT CHANGE UP (ref 6–8.3)
PROT SERPL-MCNC: 6.8 G/DL — SIGNIFICANT CHANGE UP (ref 6–8.3)
PROT SERPL-MCNC: 6.9 G/DL — SIGNIFICANT CHANGE UP (ref 6–8.3)
PROT SERPL-MCNC: 7 G/DL — SIGNIFICANT CHANGE UP (ref 6–8.3)
PROT SERPL-MCNC: 7.2 G/DL — SIGNIFICANT CHANGE UP (ref 6–8.3)
PROT SERPL-MCNC: 7.3 G/DL — SIGNIFICANT CHANGE UP (ref 6–8.3)
PROT SERPL-MCNC: 7.4 G/DL — SIGNIFICANT CHANGE UP (ref 6–8.3)
PROT SERPL-MCNC: 7.5 G/DL — SIGNIFICANT CHANGE UP (ref 6–8.3)
PROT SERPL-MCNC: 7.6 G/DL — SIGNIFICANT CHANGE UP (ref 6–8.3)
PROT UR-MCNC: SIGNIFICANT CHANGE UP
PROTHROM AB SERPL-ACNC: 10.4 SEC — SIGNIFICANT CHANGE UP (ref 9.8–12.7)
PROTHROM AB SERPL-ACNC: 10.9 SEC — SIGNIFICANT CHANGE UP (ref 9.8–12.7)
PROTHROM AB SERPL-ACNC: 11.4 SEC — SIGNIFICANT CHANGE UP (ref 9.8–12.7)
PROTHROM AB SERPL-ACNC: 11.6 SEC — SIGNIFICANT CHANGE UP (ref 9.8–12.7)
PROTHROM AB SERPL-ACNC: 11.7 SEC — SIGNIFICANT CHANGE UP (ref 9.8–12.7)
PROTHROM AB SERPL-ACNC: 11.7 SEC — SIGNIFICANT CHANGE UP (ref 9.8–12.7)
PROTHROM AB SERPL-ACNC: 11.8 SEC — SIGNIFICANT CHANGE UP (ref 9.8–12.7)
PROTHROM AB SERPL-ACNC: 11.9 SEC — SIGNIFICANT CHANGE UP (ref 9.8–12.7)
PROTHROM AB SERPL-ACNC: 12 SEC — SIGNIFICANT CHANGE UP (ref 9.8–12.7)
PROTHROM AB SERPL-ACNC: 12.1 SEC — SIGNIFICANT CHANGE UP (ref 9.8–12.7)
PROTHROM AB SERPL-ACNC: 12.1 SEC — SIGNIFICANT CHANGE UP (ref 9.8–12.7)
RBC # BLD: 4.41 M/UL — SIGNIFICANT CHANGE UP (ref 4.2–5.8)
RBC # BLD: 4.65 M/UL — SIGNIFICANT CHANGE UP (ref 4.2–5.8)
RBC # BLD: 4.72 M/UL — SIGNIFICANT CHANGE UP (ref 4.2–5.8)
RBC # BLD: 4.77 M/UL — SIGNIFICANT CHANGE UP (ref 4.2–5.8)
RBC # BLD: 4.78 M/UL — SIGNIFICANT CHANGE UP (ref 4.2–5.8)
RBC # BLD: 4.84 M/UL — SIGNIFICANT CHANGE UP (ref 4.2–5.8)
RBC # BLD: 4.85 M/UL — SIGNIFICANT CHANGE UP (ref 4.2–5.8)
RBC # BLD: 4.89 M/UL — SIGNIFICANT CHANGE UP (ref 4.2–5.8)
RBC # BLD: 4.93 M/UL — SIGNIFICANT CHANGE UP (ref 4.2–5.8)
RBC # BLD: 4.98 M/UL — SIGNIFICANT CHANGE UP (ref 4.2–5.8)
RBC # BLD: 5.02 M/UL — SIGNIFICANT CHANGE UP (ref 4.2–5.8)
RBC # BLD: 5.11 M/UL — SIGNIFICANT CHANGE UP (ref 4.2–5.8)
RBC # BLD: 5.13 M/UL — SIGNIFICANT CHANGE UP (ref 4.2–5.8)
RBC # BLD: 5.18 M/UL — SIGNIFICANT CHANGE UP (ref 4.2–5.8)
RBC # FLD: 12.4 % — SIGNIFICANT CHANGE UP (ref 10.3–14.5)
RBC # FLD: 12.5 % — SIGNIFICANT CHANGE UP (ref 10.3–14.5)
RBC # FLD: 12.6 % — SIGNIFICANT CHANGE UP (ref 10.3–14.5)
RBC # FLD: 12.7 % — SIGNIFICANT CHANGE UP (ref 10.3–14.5)
RBC # FLD: 12.8 % — SIGNIFICANT CHANGE UP (ref 10.3–14.5)
RBC # FLD: 12.9 % — SIGNIFICANT CHANGE UP (ref 10.3–14.5)
RBC # FLD: 13.2 % — SIGNIFICANT CHANGE UP (ref 10.3–14.5)
RBC # FLD: 13.3 % — SIGNIFICANT CHANGE UP (ref 10.3–14.5)
RH IG SCN BLD-IMP: POSITIVE — SIGNIFICANT CHANGE UP
SODIUM SERPL-SCNC: 139 MMOL/L — SIGNIFICANT CHANGE UP (ref 135–145)
SODIUM SERPL-SCNC: 140 MMOL/L — SIGNIFICANT CHANGE UP (ref 135–145)
SODIUM SERPL-SCNC: 141 MMOL/L — SIGNIFICANT CHANGE UP (ref 135–145)
SODIUM SERPL-SCNC: 142 MMOL/L — SIGNIFICANT CHANGE UP (ref 135–145)
SODIUM SERPL-SCNC: 142 MMOL/L — SIGNIFICANT CHANGE UP (ref 135–145)
SODIUM SERPL-SCNC: 143 MMOL/L — SIGNIFICANT CHANGE UP (ref 135–145)
SODIUM SERPL-SCNC: 143 MMOL/L — SIGNIFICANT CHANGE UP (ref 135–145)
SODIUM SERPL-SCNC: 144 MMOL/L — SIGNIFICANT CHANGE UP (ref 135–145)
SODIUM SERPL-SCNC: 144 MMOL/L — SIGNIFICANT CHANGE UP (ref 135–145)
SODIUM SERPL-SCNC: 147 MMOL/L — HIGH (ref 135–145)
SODIUM SERPL-SCNC: 151 MMOL/L — HIGH (ref 135–145)
SP GR SPEC: 1.03 — HIGH (ref 1.01–1.02)
SPECIMEN SOURCE: SIGNIFICANT CHANGE UP
THC UR QL: NEGATIVE — SIGNIFICANT CHANGE UP
TOTAL CHOLESTEROL/HDL RATIO MEASUREMENT: 3 RATIO — LOW (ref 3.4–9.6)
TRIGL SERPL-MCNC: 709 MG/DL — HIGH (ref 10–149)
TROPONIN T, HIGH SENSITIVITY RESULT: 353 NG/L — HIGH (ref 0–51)
TSH SERPL-MCNC: 0.05 UIU/ML — LOW (ref 0.27–4.2)
UROBILINOGEN FLD QL: NEGATIVE — SIGNIFICANT CHANGE UP
WBC # BLD: 11.9 K/UL — HIGH (ref 3.8–10.5)
WBC # BLD: 13.6 K/UL — HIGH (ref 3.8–10.5)
WBC # BLD: 13.9 K/UL — HIGH (ref 3.8–10.5)
WBC # BLD: 14.2 K/UL — HIGH (ref 3.8–10.5)
WBC # BLD: 14.2 K/UL — HIGH (ref 3.8–10.5)
WBC # BLD: 14.4 K/UL — HIGH (ref 3.8–10.5)
WBC # BLD: 14.5 K/UL — HIGH (ref 3.8–10.5)
WBC # BLD: 14.6 K/UL — HIGH (ref 3.8–10.5)
WBC # BLD: 14.9 K/UL — HIGH (ref 3.8–10.5)
WBC # BLD: 14.9 K/UL — HIGH (ref 3.8–10.5)
WBC # BLD: 15 K/UL — HIGH (ref 3.8–10.5)
WBC # BLD: 15.6 K/UL — HIGH (ref 3.8–10.5)
WBC # BLD: 16.5 K/UL — HIGH (ref 3.8–10.5)
WBC # BLD: 17.3 K/UL — HIGH (ref 3.8–10.5)
WBC # FLD AUTO: 11.9 K/UL — HIGH (ref 3.8–10.5)
WBC # FLD AUTO: 13.6 K/UL — HIGH (ref 3.8–10.5)
WBC # FLD AUTO: 13.9 K/UL — HIGH (ref 3.8–10.5)
WBC # FLD AUTO: 14.2 K/UL — HIGH (ref 3.8–10.5)
WBC # FLD AUTO: 14.2 K/UL — HIGH (ref 3.8–10.5)
WBC # FLD AUTO: 14.4 K/UL — HIGH (ref 3.8–10.5)
WBC # FLD AUTO: 14.5 K/UL — HIGH (ref 3.8–10.5)
WBC # FLD AUTO: 14.6 K/UL — HIGH (ref 3.8–10.5)
WBC # FLD AUTO: 14.9 K/UL — HIGH (ref 3.8–10.5)
WBC # FLD AUTO: 14.9 K/UL — HIGH (ref 3.8–10.5)
WBC # FLD AUTO: 15 K/UL — HIGH (ref 3.8–10.5)
WBC # FLD AUTO: 15.6 K/UL — HIGH (ref 3.8–10.5)
WBC # FLD AUTO: 16.5 K/UL — HIGH (ref 3.8–10.5)
WBC # FLD AUTO: 17.3 K/UL — HIGH (ref 3.8–10.5)

## 2018-01-01 PROCEDURE — 83605 ASSAY OF LACTIC ACID: CPT

## 2018-01-01 PROCEDURE — 99497 ADVNCD CARE PLAN 30 MIN: CPT

## 2018-01-01 PROCEDURE — 82565 ASSAY OF CREATININE: CPT

## 2018-01-01 PROCEDURE — 95951: CPT

## 2018-01-01 PROCEDURE — 93010 ELECTROCARDIOGRAM REPORT: CPT

## 2018-01-01 PROCEDURE — 85610 PROTHROMBIN TIME: CPT

## 2018-01-01 PROCEDURE — 84484 ASSAY OF TROPONIN QUANT: CPT

## 2018-01-01 PROCEDURE — 83874 ASSAY OF MYOGLOBIN: CPT

## 2018-01-01 PROCEDURE — 71045 X-RAY EXAM CHEST 1 VIEW: CPT | Mod: 26

## 2018-01-01 PROCEDURE — 93010 ELECTROCARDIOGRAM REPORT: CPT | Mod: 77

## 2018-01-01 PROCEDURE — 93306 TTE W/DOPPLER COMPLETE: CPT | Mod: 26

## 2018-01-01 PROCEDURE — 94003 VENT MGMT INPAT SUBQ DAY: CPT

## 2018-01-01 PROCEDURE — 80307 DRUG TEST PRSMV CHEM ANLYZR: CPT

## 2018-01-01 PROCEDURE — 86850 RBC ANTIBODY SCREEN: CPT

## 2018-01-01 PROCEDURE — 85027 COMPLETE CBC AUTOMATED: CPT

## 2018-01-01 PROCEDURE — 71045 X-RAY EXAM CHEST 1 VIEW: CPT | Mod: 26,76

## 2018-01-01 PROCEDURE — 86900 BLOOD TYPING SEROLOGIC ABO: CPT

## 2018-01-01 PROCEDURE — 83036 HEMOGLOBIN GLYCOSYLATED A1C: CPT

## 2018-01-01 PROCEDURE — 85730 THROMBOPLASTIN TIME PARTIAL: CPT

## 2018-01-01 PROCEDURE — 99291 CRITICAL CARE FIRST HOUR: CPT

## 2018-01-01 PROCEDURE — 82962 GLUCOSE BLOOD TEST: CPT

## 2018-01-01 PROCEDURE — 94002 VENT MGMT INPAT INIT DAY: CPT

## 2018-01-01 PROCEDURE — 99223 1ST HOSP IP/OBS HIGH 75: CPT | Mod: GC

## 2018-01-01 PROCEDURE — 82553 CREATINE MB FRACTION: CPT

## 2018-01-01 PROCEDURE — 82550 ASSAY OF CK (CPK): CPT

## 2018-01-01 PROCEDURE — 85014 HEMATOCRIT: CPT

## 2018-01-01 PROCEDURE — 99233 SBSQ HOSP IP/OBS HIGH 50: CPT

## 2018-01-01 PROCEDURE — 84132 ASSAY OF SERUM POTASSIUM: CPT

## 2018-01-01 PROCEDURE — 95951: CPT | Mod: 26

## 2018-01-01 PROCEDURE — 86901 BLOOD TYPING SEROLOGIC RH(D): CPT

## 2018-01-01 PROCEDURE — 87040 BLOOD CULTURE FOR BACTERIA: CPT

## 2018-01-01 PROCEDURE — 70450 CT HEAD/BRAIN W/O DYE: CPT | Mod: 26

## 2018-01-01 PROCEDURE — 95819 EEG AWAKE AND ASLEEP: CPT | Mod: 26

## 2018-01-01 PROCEDURE — 84295 ASSAY OF SERUM SODIUM: CPT

## 2018-01-01 PROCEDURE — 93005 ELECTROCARDIOGRAM TRACING: CPT

## 2018-01-01 PROCEDURE — 82150 ASSAY OF AMYLASE: CPT

## 2018-01-01 PROCEDURE — 84443 ASSAY THYROID STIM HORMONE: CPT

## 2018-01-01 PROCEDURE — 82803 BLOOD GASES ANY COMBINATION: CPT

## 2018-01-01 PROCEDURE — 83690 ASSAY OF LIPASE: CPT

## 2018-01-01 PROCEDURE — 93010 ELECTROCARDIOGRAM REPORT: CPT | Mod: 77,76

## 2018-01-01 PROCEDURE — 81003 URINALYSIS AUTO W/O SCOPE: CPT

## 2018-01-01 PROCEDURE — 83735 ASSAY OF MAGNESIUM: CPT

## 2018-01-01 PROCEDURE — 82947 ASSAY GLUCOSE BLOOD QUANT: CPT

## 2018-01-01 PROCEDURE — 71045 X-RAY EXAM CHEST 1 VIEW: CPT

## 2018-01-01 PROCEDURE — 82330 ASSAY OF CALCIUM: CPT

## 2018-01-01 PROCEDURE — 70450 CT HEAD/BRAIN W/O DYE: CPT

## 2018-01-01 PROCEDURE — 80061 LIPID PANEL: CPT

## 2018-01-01 PROCEDURE — 82435 ASSAY OF BLOOD CHLORIDE: CPT

## 2018-01-01 PROCEDURE — 84100 ASSAY OF PHOSPHORUS: CPT

## 2018-01-01 PROCEDURE — 95819 EEG AWAKE AND ASLEEP: CPT

## 2018-01-01 PROCEDURE — 80053 COMPREHEN METABOLIC PANEL: CPT

## 2018-01-01 PROCEDURE — 85384 FIBRINOGEN ACTIVITY: CPT

## 2018-01-01 PROCEDURE — 93306 TTE W/DOPPLER COMPLETE: CPT

## 2018-01-01 PROCEDURE — 87086 URINE CULTURE/COLONY COUNT: CPT

## 2018-01-01 RX ORDER — POTASSIUM CHLORIDE 20 MEQ
40 PACKET (EA) ORAL ONCE
Qty: 0 | Refills: 0 | Status: COMPLETED | OUTPATIENT
Start: 2018-01-01 | End: 2018-01-01

## 2018-01-01 RX ORDER — POTASSIUM CHLORIDE 20 MEQ
20 PACKET (EA) ORAL ONCE
Qty: 0 | Refills: 0 | Status: COMPLETED | OUTPATIENT
Start: 2018-01-01 | End: 2018-01-01

## 2018-01-01 RX ORDER — HYDRALAZINE HCL 50 MG
50 TABLET ORAL EVERY 8 HOURS
Qty: 0 | Refills: 0 | Status: DISCONTINUED | OUTPATIENT
Start: 2018-01-01 | End: 2018-01-01

## 2018-01-01 RX ORDER — NITROGLYCERIN 6.5 MG
5 CAPSULE, EXTENDED RELEASE ORAL
Qty: 50 | Refills: 0 | Status: DISCONTINUED | OUTPATIENT
Start: 2018-01-01 | End: 2018-01-01

## 2018-01-01 RX ORDER — POTASSIUM CHLORIDE 20 MEQ
20 PACKET (EA) ORAL
Qty: 0 | Refills: 0 | Status: COMPLETED | OUTPATIENT
Start: 2018-01-01 | End: 2018-01-01

## 2018-01-01 RX ORDER — MORPHINE SULFATE 50 MG/1
2 CAPSULE, EXTENDED RELEASE ORAL ONCE
Qty: 0 | Refills: 0 | Status: DISCONTINUED | OUTPATIENT
Start: 2018-01-01 | End: 2018-01-01

## 2018-01-01 RX ORDER — MORPHINE SULFATE 50 MG/1
4 CAPSULE, EXTENDED RELEASE ORAL
Qty: 100 | Refills: 0 | Status: DISCONTINUED | OUTPATIENT
Start: 2018-01-01 | End: 2018-01-01

## 2018-01-01 RX ORDER — ASPIRIN/CALCIUM CARB/MAGNESIUM 324 MG
81 TABLET ORAL DAILY
Qty: 0 | Refills: 0 | Status: DISCONTINUED | OUTPATIENT
Start: 2018-01-01 | End: 2018-01-01

## 2018-01-01 RX ORDER — PROPOFOL 10 MG/ML
35 INJECTION, EMULSION INTRAVENOUS
Qty: 1000 | Refills: 0 | Status: DISCONTINUED | OUTPATIENT
Start: 2018-01-01 | End: 2018-01-01

## 2018-01-01 RX ORDER — MIDAZOLAM HYDROCHLORIDE 1 MG/ML
0.02 INJECTION, SOLUTION INTRAMUSCULAR; INTRAVENOUS
Qty: 100 | Refills: 0 | Status: DISCONTINUED | OUTPATIENT
Start: 2018-01-01 | End: 2018-01-01

## 2018-01-01 RX ORDER — HYDRALAZINE HCL 50 MG
25 TABLET ORAL ONCE
Qty: 0 | Refills: 0 | Status: COMPLETED | OUTPATIENT
Start: 2018-01-01 | End: 2018-01-01

## 2018-01-01 RX ORDER — HEPARIN SODIUM 5000 [USP'U]/ML
INJECTION INTRAVENOUS; SUBCUTANEOUS
Qty: 25000 | Refills: 0 | Status: DISCONTINUED | OUTPATIENT
Start: 2018-01-01 | End: 2018-01-01

## 2018-01-01 RX ORDER — FUROSEMIDE 40 MG
80 TABLET ORAL EVERY 8 HOURS
Qty: 0 | Refills: 0 | Status: DISCONTINUED | OUTPATIENT
Start: 2018-01-01 | End: 2018-01-01

## 2018-01-01 RX ORDER — ROBINUL 0.2 MG/ML
0.4 INJECTION INTRAMUSCULAR; INTRAVENOUS EVERY 6 HOURS
Qty: 0 | Refills: 0 | Status: DISCONTINUED | OUTPATIENT
Start: 2018-01-01 | End: 2018-01-01

## 2018-01-01 RX ORDER — PROPOFOL 10 MG/ML
35 INJECTION, EMULSION INTRAVENOUS
Qty: 500 | Refills: 0 | Status: DISCONTINUED | OUTPATIENT
Start: 2018-01-01 | End: 2018-01-01

## 2018-01-01 RX ORDER — FENTANYL CITRATE 50 UG/ML
50 INJECTION INTRAVENOUS ONCE
Qty: 0 | Refills: 0 | Status: DISCONTINUED | OUTPATIENT
Start: 2018-01-01 | End: 2018-01-01

## 2018-01-01 RX ORDER — ERYTHROMYCIN BASE 5 MG/GRAM
1 OINTMENT (GRAM) OPHTHALMIC (EYE) EVERY 4 HOURS
Qty: 0 | Refills: 0 | Status: DISCONTINUED | OUTPATIENT
Start: 2018-01-01 | End: 2018-01-01

## 2018-01-01 RX ORDER — PROPOFOL 10 MG/ML
25 INJECTION, EMULSION INTRAVENOUS
Qty: 500 | Refills: 0 | Status: DISCONTINUED | OUTPATIENT
Start: 2018-01-01 | End: 2018-01-01

## 2018-01-01 RX ORDER — MORPHINE SULFATE 50 MG/1
2 CAPSULE, EXTENDED RELEASE ORAL
Qty: 0 | Refills: 0 | Status: DISCONTINUED | OUTPATIENT
Start: 2018-01-01 | End: 2018-01-01

## 2018-01-01 RX ORDER — SODIUM CHLORIDE 9 MG/ML
500 INJECTION, SOLUTION INTRAVENOUS ONCE
Qty: 0 | Refills: 0 | Status: COMPLETED | OUTPATIENT
Start: 2018-01-01 | End: 2018-01-01

## 2018-01-01 RX ORDER — PROPOFOL 10 MG/ML
25 INJECTION, EMULSION INTRAVENOUS
Qty: 1000 | Refills: 0 | Status: DISCONTINUED | OUTPATIENT
Start: 2018-01-01 | End: 2018-01-01

## 2018-01-01 RX ORDER — NOREPINEPHRINE BITARTRATE/D5W 8 MG/250ML
0.05 PLASTIC BAG, INJECTION (ML) INTRAVENOUS
Qty: 16 | Refills: 0 | Status: DISCONTINUED | OUTPATIENT
Start: 2018-01-01 | End: 2018-01-01

## 2018-01-01 RX ORDER — HYDRALAZINE HCL 50 MG
10 TABLET ORAL EVERY 8 HOURS
Qty: 0 | Refills: 0 | Status: DISCONTINUED | OUTPATIENT
Start: 2018-01-01 | End: 2018-01-01

## 2018-01-01 RX ORDER — PANTOPRAZOLE SODIUM 20 MG/1
40 TABLET, DELAYED RELEASE ORAL DAILY
Qty: 0 | Refills: 0 | Status: DISCONTINUED | OUTPATIENT
Start: 2018-01-01 | End: 2018-01-01

## 2018-01-01 RX ORDER — HEPARIN SODIUM 5000 [USP'U]/ML
4000 INJECTION INTRAVENOUS; SUBCUTANEOUS EVERY 6 HOURS
Qty: 0 | Refills: 0 | Status: DISCONTINUED | OUTPATIENT
Start: 2018-01-01 | End: 2018-01-01

## 2018-01-01 RX ORDER — FUROSEMIDE 40 MG
80 TABLET ORAL EVERY 12 HOURS
Qty: 0 | Refills: 0 | Status: DISCONTINUED | OUTPATIENT
Start: 2018-01-01 | End: 2018-01-01

## 2018-01-01 RX ORDER — MIDAZOLAM HYDROCHLORIDE 1 MG/ML
2 INJECTION, SOLUTION INTRAMUSCULAR; INTRAVENOUS ONCE
Qty: 0 | Refills: 0 | Status: DISCONTINUED | OUTPATIENT
Start: 2018-01-01 | End: 2018-01-01

## 2018-01-01 RX ORDER — MORPHINE SULFATE 50 MG/1
1 CAPSULE, EXTENDED RELEASE ORAL
Qty: 100 | Refills: 0 | Status: DISCONTINUED | OUTPATIENT
Start: 2018-01-01 | End: 2018-01-01

## 2018-01-01 RX ORDER — HYDRALAZINE HCL 50 MG
10 TABLET ORAL ONCE
Qty: 0 | Refills: 0 | Status: COMPLETED | OUTPATIENT
Start: 2018-01-01 | End: 2018-01-01

## 2018-01-01 RX ORDER — MAGNESIUM SULFATE 500 MG/ML
2 VIAL (ML) INJECTION ONCE
Qty: 0 | Refills: 0 | Status: COMPLETED | OUTPATIENT
Start: 2018-01-01 | End: 2018-01-01

## 2018-01-01 RX ORDER — PIPERACILLIN AND TAZOBACTAM 4; .5 G/20ML; G/20ML
3.38 INJECTION, POWDER, LYOPHILIZED, FOR SOLUTION INTRAVENOUS EVERY 8 HOURS
Qty: 0 | Refills: 0 | Status: DISCONTINUED | OUTPATIENT
Start: 2018-01-01 | End: 2018-01-01

## 2018-01-01 RX ORDER — VANCOMYCIN HCL 1 G
1000 VIAL (EA) INTRAVENOUS EVERY 12 HOURS
Qty: 0 | Refills: 0 | Status: DISCONTINUED | OUTPATIENT
Start: 2018-01-01 | End: 2018-01-01

## 2018-01-01 RX ORDER — TICAGRELOR 90 MG/1
90 TABLET ORAL
Qty: 0 | Refills: 0 | Status: DISCONTINUED | OUTPATIENT
Start: 2018-01-01 | End: 2018-01-01

## 2018-01-01 RX ORDER — CHLORHEXIDINE GLUCONATE 213 G/1000ML
15 SOLUTION TOPICAL
Qty: 0 | Refills: 0 | Status: DISCONTINUED | OUTPATIENT
Start: 2018-01-01 | End: 2018-01-01

## 2018-01-01 RX ORDER — HEPARIN SODIUM 5000 [USP'U]/ML
4000 INJECTION INTRAVENOUS; SUBCUTANEOUS ONCE
Qty: 0 | Refills: 0 | Status: COMPLETED | OUTPATIENT
Start: 2018-01-01 | End: 2018-01-01

## 2018-01-01 RX ORDER — HYDRALAZINE HCL 50 MG
25 TABLET ORAL EVERY 8 HOURS
Qty: 0 | Refills: 0 | Status: DISCONTINUED | OUTPATIENT
Start: 2018-01-01 | End: 2018-01-01

## 2018-01-01 RX ORDER — ISOSORBIDE DINITRATE 5 MG/1
20 TABLET ORAL EVERY 8 HOURS
Qty: 0 | Refills: 0 | Status: DISCONTINUED | OUTPATIENT
Start: 2018-01-01 | End: 2018-01-01

## 2018-01-01 RX ORDER — HEPARIN SODIUM 5000 [USP'U]/ML
5000 INJECTION INTRAVENOUS; SUBCUTANEOUS EVERY 8 HOURS
Qty: 0 | Refills: 0 | Status: DISCONTINUED | OUTPATIENT
Start: 2018-01-01 | End: 2018-01-01

## 2018-01-01 RX ORDER — SODIUM CHLORIDE 9 MG/ML
1000 INJECTION, SOLUTION INTRAVENOUS
Qty: 0 | Refills: 0 | Status: DISCONTINUED | OUTPATIENT
Start: 2018-01-01 | End: 2018-01-01

## 2018-01-01 RX ORDER — DEXMEDETOMIDINE HYDROCHLORIDE IN 0.9% SODIUM CHLORIDE 4 UG/ML
0.08 INJECTION INTRAVENOUS
Qty: 200 | Refills: 0 | Status: DISCONTINUED | OUTPATIENT
Start: 2018-01-01 | End: 2018-01-01

## 2018-01-01 RX ORDER — ATORVASTATIN CALCIUM 80 MG/1
80 TABLET, FILM COATED ORAL AT BEDTIME
Qty: 0 | Refills: 0 | Status: DISCONTINUED | OUTPATIENT
Start: 2018-01-01 | End: 2018-01-01

## 2018-01-01 RX ORDER — ISOSORBIDE DINITRATE 5 MG/1
10 TABLET ORAL EVERY 8 HOURS
Qty: 0 | Refills: 0 | Status: DISCONTINUED | OUTPATIENT
Start: 2018-01-01 | End: 2018-01-01

## 2018-01-01 RX ORDER — ISOSORBIDE DINITRATE 5 MG/1
10 TABLET ORAL ONCE
Qty: 0 | Refills: 0 | Status: COMPLETED | OUTPATIENT
Start: 2018-01-01 | End: 2018-01-01

## 2018-01-01 RX ORDER — ACETAMINOPHEN 500 MG
1000 TABLET ORAL ONCE
Qty: 0 | Refills: 0 | Status: COMPLETED | OUTPATIENT
Start: 2018-01-01 | End: 2018-01-01

## 2018-01-01 RX ORDER — ISOSORBIDE DINITRATE 5 MG/1
5 TABLET ORAL EVERY 8 HOURS
Qty: 0 | Refills: 0 | Status: DISCONTINUED | OUTPATIENT
Start: 2018-01-01 | End: 2018-01-01

## 2018-01-01 RX ORDER — POTASSIUM CHLORIDE 20 MEQ
20 PACKET (EA) ORAL ONCE
Qty: 0 | Refills: 0 | Status: DISCONTINUED | OUTPATIENT
Start: 2018-01-01 | End: 2018-01-01

## 2018-01-01 RX ORDER — ROBINUL 0.2 MG/ML
0.4 INJECTION INTRAMUSCULAR; INTRAVENOUS ONCE
Qty: 0 | Refills: 0 | Status: COMPLETED | OUTPATIENT
Start: 2018-01-01 | End: 2018-01-01

## 2018-01-01 RX ADMIN — Medication 81 MILLIGRAM(S): at 12:11

## 2018-01-01 RX ADMIN — Medication 81 MILLIGRAM(S): at 11:57

## 2018-01-01 RX ADMIN — Medication 1 APPLICATION(S): at 21:40

## 2018-01-01 RX ADMIN — PROPOFOL 21.57 MICROGRAM(S)/KG/MIN: 10 INJECTION, EMULSION INTRAVENOUS at 14:30

## 2018-01-01 RX ADMIN — ISOSORBIDE DINITRATE 5 MILLIGRAM(S): 5 TABLET ORAL at 05:15

## 2018-01-01 RX ADMIN — SODIUM CHLORIDE 75 MILLILITER(S): 9 INJECTION, SOLUTION INTRAVENOUS at 05:41

## 2018-01-01 RX ADMIN — ATORVASTATIN CALCIUM 80 MILLIGRAM(S): 80 TABLET, FILM COATED ORAL at 22:12

## 2018-01-01 RX ADMIN — PANTOPRAZOLE SODIUM 40 MILLIGRAM(S): 20 TABLET, DELAYED RELEASE ORAL at 11:36

## 2018-01-01 RX ADMIN — Medication 100 MILLIEQUIVALENT(S): at 10:57

## 2018-01-01 RX ADMIN — Medication 1 APPLICATION(S): at 23:44

## 2018-01-01 RX ADMIN — Medication 10 MILLIGRAM(S): at 05:34

## 2018-01-01 RX ADMIN — Medication 1 APPLICATION(S): at 11:05

## 2018-01-01 RX ADMIN — Medication 100 MILLIEQUIVALENT(S): at 00:08

## 2018-01-01 RX ADMIN — ISOSORBIDE DINITRATE 20 MILLIGRAM(S): 5 TABLET ORAL at 18:13

## 2018-01-01 RX ADMIN — Medication 100 MILLIEQUIVALENT(S): at 21:37

## 2018-01-01 RX ADMIN — CHLORHEXIDINE GLUCONATE 15 MILLILITER(S): 213 SOLUTION TOPICAL at 05:18

## 2018-01-01 RX ADMIN — MORPHINE SULFATE 2 MG/HR: 50 CAPSULE, EXTENDED RELEASE ORAL at 23:38

## 2018-01-01 RX ADMIN — Medication 10 MILLIGRAM(S): at 15:25

## 2018-01-01 RX ADMIN — ISOSORBIDE DINITRATE 20 MILLIGRAM(S): 5 TABLET ORAL at 14:36

## 2018-01-01 RX ADMIN — Medication 1 APPLICATION(S): at 05:19

## 2018-01-01 RX ADMIN — Medication 81 MILLIGRAM(S): at 11:08

## 2018-01-01 RX ADMIN — Medication 80 MILLIGRAM(S): at 14:36

## 2018-01-01 RX ADMIN — Medication 1 APPLICATION(S): at 17:23

## 2018-01-01 RX ADMIN — Medication 50 MILLIGRAM(S): at 21:09

## 2018-01-01 RX ADMIN — CHLORHEXIDINE GLUCONATE 15 MILLILITER(S): 213 SOLUTION TOPICAL at 18:14

## 2018-01-01 RX ADMIN — HEPARIN SODIUM 1000 UNIT(S)/HR: 5000 INJECTION INTRAVENOUS; SUBCUTANEOUS at 17:49

## 2018-01-01 RX ADMIN — Medication 81 MILLIGRAM(S): at 10:51

## 2018-01-01 RX ADMIN — CHLORHEXIDINE GLUCONATE 15 MILLILITER(S): 213 SOLUTION TOPICAL at 05:31

## 2018-01-01 RX ADMIN — TICAGRELOR 90 MILLIGRAM(S): 90 TABLET ORAL at 05:37

## 2018-01-01 RX ADMIN — HEPARIN SODIUM 1000 UNIT(S)/HR: 5000 INJECTION INTRAVENOUS; SUBCUTANEOUS at 12:20

## 2018-01-01 RX ADMIN — Medication 100 MILLIEQUIVALENT(S): at 13:06

## 2018-01-01 RX ADMIN — Medication 100 MILLIEQUIVALENT(S): at 05:23

## 2018-01-01 RX ADMIN — Medication 81 MILLIGRAM(S): at 11:00

## 2018-01-01 RX ADMIN — Medication 10 MILLIGRAM(S): at 13:01

## 2018-01-01 RX ADMIN — Medication 1 APPLICATION(S): at 21:11

## 2018-01-01 RX ADMIN — PANTOPRAZOLE SODIUM 40 MILLIGRAM(S): 20 TABLET, DELAYED RELEASE ORAL at 11:05

## 2018-01-01 RX ADMIN — Medication 50 MILLIGRAM(S): at 05:31

## 2018-01-01 RX ADMIN — PROPOFOL 15.4 MICROGRAM(S)/KG/MIN: 10 INJECTION, EMULSION INTRAVENOUS at 21:08

## 2018-01-01 RX ADMIN — Medication 1 APPLICATION(S): at 05:24

## 2018-01-01 RX ADMIN — HEPARIN SODIUM 5000 UNIT(S): 5000 INJECTION INTRAVENOUS; SUBCUTANEOUS at 05:30

## 2018-01-01 RX ADMIN — Medication 1.5 MICROGRAM(S)/MIN: at 05:43

## 2018-01-01 RX ADMIN — Medication 50 MILLIEQUIVALENT(S): at 20:50

## 2018-01-01 RX ADMIN — DEXMEDETOMIDINE HYDROCHLORIDE IN 0.9% SODIUM CHLORIDE 2 MICROGRAM(S)/KG/HR: 4 INJECTION INTRAVENOUS at 10:09

## 2018-01-01 RX ADMIN — ISOSORBIDE DINITRATE 20 MILLIGRAM(S): 5 TABLET ORAL at 13:29

## 2018-01-01 RX ADMIN — HEPARIN SODIUM 5000 UNIT(S): 5000 INJECTION INTRAVENOUS; SUBCUTANEOUS at 21:38

## 2018-01-01 RX ADMIN — Medication 1 APPLICATION(S): at 06:34

## 2018-01-01 RX ADMIN — Medication 1 APPLICATION(S): at 17:00

## 2018-01-01 RX ADMIN — Medication 1 APPLICATION(S): at 17:12

## 2018-01-01 RX ADMIN — CHLORHEXIDINE GLUCONATE 15 MILLILITER(S): 213 SOLUTION TOPICAL at 05:41

## 2018-01-01 RX ADMIN — PANTOPRAZOLE SODIUM 40 MILLIGRAM(S): 20 TABLET, DELAYED RELEASE ORAL at 14:36

## 2018-01-01 RX ADMIN — PANTOPRAZOLE SODIUM 40 MILLIGRAM(S): 20 TABLET, DELAYED RELEASE ORAL at 11:12

## 2018-01-01 RX ADMIN — PROPOFOL 21.57 MICROGRAM(S)/KG/MIN: 10 INJECTION, EMULSION INTRAVENOUS at 09:38

## 2018-01-01 RX ADMIN — HEPARIN SODIUM 1000 UNIT(S)/HR: 5000 INJECTION INTRAVENOUS; SUBCUTANEOUS at 19:06

## 2018-01-01 RX ADMIN — Medication 1 APPLICATION(S): at 05:36

## 2018-01-01 RX ADMIN — TICAGRELOR 90 MILLIGRAM(S): 90 TABLET ORAL at 05:15

## 2018-01-01 RX ADMIN — Medication 1 APPLICATION(S): at 05:42

## 2018-01-01 RX ADMIN — PROPOFOL 15.4 MICROGRAM(S)/KG/MIN: 10 INJECTION, EMULSION INTRAVENOUS at 09:32

## 2018-01-01 RX ADMIN — CHLORHEXIDINE GLUCONATE 15 MILLILITER(S): 213 SOLUTION TOPICAL at 16:57

## 2018-01-01 RX ADMIN — MIDAZOLAM HYDROCHLORIDE 2.05 MG/KG/HR: 1 INJECTION, SOLUTION INTRAMUSCULAR; INTRAVENOUS at 07:11

## 2018-01-01 RX ADMIN — CHLORHEXIDINE GLUCONATE 15 MILLILITER(S): 213 SOLUTION TOPICAL at 05:34

## 2018-01-01 RX ADMIN — Medication 1 APPLICATION(S): at 23:21

## 2018-01-01 RX ADMIN — Medication 10 MILLIGRAM(S): at 02:35

## 2018-01-01 RX ADMIN — Medication 1 APPLICATION(S): at 00:13

## 2018-01-01 RX ADMIN — MORPHINE SULFATE 1 MG/HR: 50 CAPSULE, EXTENDED RELEASE ORAL at 14:30

## 2018-01-01 RX ADMIN — PROPOFOL 21.57 MICROGRAM(S)/KG/MIN: 10 INJECTION, EMULSION INTRAVENOUS at 00:08

## 2018-01-01 RX ADMIN — Medication 50 MILLIEQUIVALENT(S): at 06:21

## 2018-01-01 RX ADMIN — Medication 1 APPLICATION(S): at 14:55

## 2018-01-01 RX ADMIN — ISOSORBIDE DINITRATE 20 MILLIGRAM(S): 5 TABLET ORAL at 05:37

## 2018-01-01 RX ADMIN — Medication 50 MILLIGRAM(S): at 05:18

## 2018-01-01 RX ADMIN — PROPOFOL 15.4 MICROGRAM(S)/KG/MIN: 10 INJECTION, EMULSION INTRAVENOUS at 05:30

## 2018-01-01 RX ADMIN — ATORVASTATIN CALCIUM 80 MILLIGRAM(S): 80 TABLET, FILM COATED ORAL at 23:34

## 2018-01-01 RX ADMIN — Medication 50 GRAM(S): at 18:16

## 2018-01-01 RX ADMIN — HEPARIN SODIUM 5000 UNIT(S): 5000 INJECTION INTRAVENOUS; SUBCUTANEOUS at 13:28

## 2018-01-01 RX ADMIN — MORPHINE SULFATE 2 MILLIGRAM(S): 50 CAPSULE, EXTENDED RELEASE ORAL at 15:15

## 2018-01-01 RX ADMIN — CHLORHEXIDINE GLUCONATE 15 MILLILITER(S): 213 SOLUTION TOPICAL at 17:05

## 2018-01-01 RX ADMIN — PROPOFOL 21.57 MICROGRAM(S)/KG/MIN: 10 INJECTION, EMULSION INTRAVENOUS at 05:34

## 2018-01-01 RX ADMIN — Medication 80 MILLIGRAM(S): at 05:14

## 2018-01-01 RX ADMIN — PROPOFOL 21.57 MICROGRAM(S)/KG/MIN: 10 INJECTION, EMULSION INTRAVENOUS at 05:43

## 2018-01-01 RX ADMIN — PROPOFOL 15.4 MICROGRAM(S)/KG/MIN: 10 INJECTION, EMULSION INTRAVENOUS at 12:17

## 2018-01-01 RX ADMIN — ROBINUL 0.4 MILLIGRAM(S): 0.2 INJECTION INTRAMUSCULAR; INTRAVENOUS at 15:40

## 2018-01-01 RX ADMIN — CHLORHEXIDINE GLUCONATE 15 MILLILITER(S): 213 SOLUTION TOPICAL at 05:23

## 2018-01-01 RX ADMIN — Medication 1 APPLICATION(S): at 16:57

## 2018-01-01 RX ADMIN — Medication 100 MILLIEQUIVALENT(S): at 05:40

## 2018-01-01 RX ADMIN — Medication 1 APPLICATION(S): at 10:10

## 2018-01-01 RX ADMIN — Medication 1 APPLICATION(S): at 11:00

## 2018-01-01 RX ADMIN — TICAGRELOR 90 MILLIGRAM(S): 90 TABLET ORAL at 05:30

## 2018-01-01 RX ADMIN — Medication 40 MILLIEQUIVALENT(S): at 18:16

## 2018-01-01 RX ADMIN — SODIUM CHLORIDE 500 MILLILITER(S): 9 INJECTION, SOLUTION INTRAVENOUS at 00:33

## 2018-01-01 RX ADMIN — Medication 1 DROP(S): at 23:22

## 2018-01-01 RX ADMIN — Medication 50 MILLIGRAM(S): at 14:55

## 2018-01-01 RX ADMIN — HEPARIN SODIUM 1200 UNIT(S)/HR: 5000 INJECTION INTRAVENOUS; SUBCUTANEOUS at 12:29

## 2018-01-01 RX ADMIN — ISOSORBIDE DINITRATE 20 MILLIGRAM(S): 5 TABLET ORAL at 05:18

## 2018-01-01 RX ADMIN — Medication 1 APPLICATION(S): at 11:04

## 2018-01-01 RX ADMIN — TICAGRELOR 90 MILLIGRAM(S): 90 TABLET ORAL at 05:34

## 2018-01-01 RX ADMIN — Medication 50 MILLIGRAM(S): at 23:20

## 2018-01-01 RX ADMIN — Medication 80 MILLIGRAM(S): at 05:30

## 2018-01-01 RX ADMIN — HEPARIN SODIUM 5000 UNIT(S): 5000 INJECTION INTRAVENOUS; SUBCUTANEOUS at 14:55

## 2018-01-01 RX ADMIN — CHLORHEXIDINE GLUCONATE 15 MILLILITER(S): 213 SOLUTION TOPICAL at 17:20

## 2018-01-01 RX ADMIN — Medication 1 DROP(S): at 11:21

## 2018-01-01 RX ADMIN — HEPARIN SODIUM 5000 UNIT(S): 5000 INJECTION INTRAVENOUS; SUBCUTANEOUS at 23:25

## 2018-01-01 RX ADMIN — Medication 250 MILLIGRAM(S): at 10:51

## 2018-01-01 RX ADMIN — Medication 10 MILLIGRAM(S): at 09:20

## 2018-01-01 RX ADMIN — Medication 100 MILLIEQUIVALENT(S): at 11:54

## 2018-01-01 RX ADMIN — HEPARIN SODIUM 900 UNIT(S)/HR: 5000 INJECTION INTRAVENOUS; SUBCUTANEOUS at 06:22

## 2018-01-01 RX ADMIN — ISOSORBIDE DINITRATE 5 MILLIGRAM(S): 5 TABLET ORAL at 13:09

## 2018-01-01 RX ADMIN — Medication 1 APPLICATION(S): at 18:14

## 2018-01-01 RX ADMIN — DEXMEDETOMIDINE HYDROCHLORIDE IN 0.9% SODIUM CHLORIDE 2 MICROGRAM(S)/KG/HR: 4 INJECTION INTRAVENOUS at 15:26

## 2018-01-01 RX ADMIN — MORPHINE SULFATE 2 MILLIGRAM(S): 50 CAPSULE, EXTENDED RELEASE ORAL at 16:48

## 2018-01-01 RX ADMIN — Medication 80 MILLIGRAM(S): at 17:01

## 2018-01-01 RX ADMIN — TICAGRELOR 90 MILLIGRAM(S): 90 TABLET ORAL at 17:01

## 2018-01-01 RX ADMIN — Medication 81 MILLIGRAM(S): at 12:20

## 2018-01-01 RX ADMIN — Medication 10 MILLIGRAM(S): at 21:52

## 2018-01-01 RX ADMIN — PROPOFOL 15.4 MICROGRAM(S)/KG/MIN: 10 INJECTION, EMULSION INTRAVENOUS at 21:39

## 2018-01-01 RX ADMIN — ISOSORBIDE DINITRATE 20 MILLIGRAM(S): 5 TABLET ORAL at 14:55

## 2018-01-01 RX ADMIN — Medication 1 APPLICATION(S): at 09:54

## 2018-01-01 RX ADMIN — Medication 1 DROP(S): at 11:13

## 2018-01-01 RX ADMIN — TICAGRELOR 90 MILLIGRAM(S): 90 TABLET ORAL at 17:12

## 2018-01-01 RX ADMIN — HEPARIN SODIUM 800 UNIT(S)/HR: 5000 INJECTION INTRAVENOUS; SUBCUTANEOUS at 11:56

## 2018-01-01 RX ADMIN — HEPARIN SODIUM 1000 UNIT(S)/HR: 5000 INJECTION INTRAVENOUS; SUBCUTANEOUS at 05:48

## 2018-01-01 RX ADMIN — Medication 1 APPLICATION(S): at 11:33

## 2018-01-01 RX ADMIN — Medication 50 MILLIGRAM(S): at 05:38

## 2018-01-01 RX ADMIN — Medication 80 MILLIGRAM(S): at 21:39

## 2018-01-01 RX ADMIN — Medication 1 DROP(S): at 05:24

## 2018-01-01 RX ADMIN — PROPOFOL 21.57 MICROGRAM(S)/KG/MIN: 10 INJECTION, EMULSION INTRAVENOUS at 06:22

## 2018-01-01 RX ADMIN — Medication 81 MILLIGRAM(S): at 11:05

## 2018-01-01 RX ADMIN — ISOSORBIDE DINITRATE 20 MILLIGRAM(S): 5 TABLET ORAL at 22:12

## 2018-01-01 RX ADMIN — MORPHINE SULFATE 2 MILLIGRAM(S): 50 CAPSULE, EXTENDED RELEASE ORAL at 14:56

## 2018-01-01 RX ADMIN — Medication 50 MILLIGRAM(S): at 13:28

## 2018-01-01 RX ADMIN — Medication 1 APPLICATION(S): at 02:00

## 2018-01-01 RX ADMIN — PROPOFOL 21.57 MICROGRAM(S)/KG/MIN: 10 INJECTION, EMULSION INTRAVENOUS at 07:55

## 2018-01-01 RX ADMIN — Medication 1 APPLICATION(S): at 15:26

## 2018-01-01 RX ADMIN — Medication 1 APPLICATION(S): at 13:36

## 2018-01-01 RX ADMIN — HEPARIN SODIUM 1200 UNIT(S)/HR: 5000 INJECTION INTRAVENOUS; SUBCUTANEOUS at 05:35

## 2018-01-01 RX ADMIN — FENTANYL CITRATE 50 MICROGRAM(S): 50 INJECTION INTRAVENOUS at 07:45

## 2018-01-01 RX ADMIN — ATORVASTATIN CALCIUM 80 MILLIGRAM(S): 80 TABLET, FILM COATED ORAL at 23:20

## 2018-01-01 RX ADMIN — MIDAZOLAM HYDROCHLORIDE 2.05 MG/KG/HR: 1 INJECTION, SOLUTION INTRAMUSCULAR; INTRAVENOUS at 05:36

## 2018-01-01 RX ADMIN — Medication 1 APPLICATION(S): at 16:59

## 2018-01-01 RX ADMIN — ATORVASTATIN CALCIUM 80 MILLIGRAM(S): 80 TABLET, FILM COATED ORAL at 21:38

## 2018-01-01 RX ADMIN — Medication 1 APPLICATION(S): at 23:15

## 2018-01-01 RX ADMIN — MORPHINE SULFATE 2 MILLIGRAM(S): 50 CAPSULE, EXTENDED RELEASE ORAL at 17:15

## 2018-01-01 RX ADMIN — Medication 1 APPLICATION(S): at 11:21

## 2018-01-01 RX ADMIN — HEPARIN SODIUM 1200 UNIT(S)/HR: 5000 INJECTION INTRAVENOUS; SUBCUTANEOUS at 23:42

## 2018-01-01 RX ADMIN — ISOSORBIDE DINITRATE 20 MILLIGRAM(S): 5 TABLET ORAL at 23:21

## 2018-01-01 RX ADMIN — Medication 50 MILLIEQUIVALENT(S): at 07:50

## 2018-01-01 RX ADMIN — ISOSORBIDE DINITRATE 5 MILLIGRAM(S): 5 TABLET ORAL at 14:30

## 2018-01-01 RX ADMIN — Medication 50 MILLIEQUIVALENT(S): at 18:47

## 2018-01-01 RX ADMIN — HEPARIN SODIUM 5000 UNIT(S): 5000 INJECTION INTRAVENOUS; SUBCUTANEOUS at 05:18

## 2018-01-01 RX ADMIN — ISOSORBIDE DINITRATE 5 MILLIGRAM(S): 5 TABLET ORAL at 05:34

## 2018-01-01 RX ADMIN — ISOSORBIDE DINITRATE 20 MILLIGRAM(S): 5 TABLET ORAL at 21:09

## 2018-01-01 RX ADMIN — Medication 50 MILLIGRAM(S): at 21:38

## 2018-01-01 RX ADMIN — Medication 50 MILLIGRAM(S): at 15:34

## 2018-01-01 RX ADMIN — PROPOFOL 15.4 MICROGRAM(S)/KG/MIN: 10 INJECTION, EMULSION INTRAVENOUS at 05:53

## 2018-01-01 RX ADMIN — Medication 250 MILLIGRAM(S): at 00:33

## 2018-01-01 RX ADMIN — Medication 50 MILLIEQUIVALENT(S): at 10:07

## 2018-01-01 RX ADMIN — Medication 50 MILLIEQUIVALENT(S): at 17:57

## 2018-01-01 RX ADMIN — PIPERACILLIN AND TAZOBACTAM 25 GRAM(S): 4; .5 INJECTION, POWDER, LYOPHILIZED, FOR SOLUTION INTRAVENOUS at 10:53

## 2018-01-01 RX ADMIN — Medication 50 MILLIGRAM(S): at 14:36

## 2018-01-01 RX ADMIN — FENTANYL CITRATE 50 MICROGRAM(S): 50 INJECTION INTRAVENOUS at 08:45

## 2018-01-01 RX ADMIN — Medication 1 APPLICATION(S): at 11:15

## 2018-01-01 RX ADMIN — PROPOFOL 15.4 MICROGRAM(S)/KG/MIN: 10 INJECTION, EMULSION INTRAVENOUS at 05:22

## 2018-01-01 RX ADMIN — PIPERACILLIN AND TAZOBACTAM 25 GRAM(S): 4; .5 INJECTION, POWDER, LYOPHILIZED, FOR SOLUTION INTRAVENOUS at 01:46

## 2018-01-01 RX ADMIN — Medication 1 APPLICATION(S): at 05:32

## 2018-01-01 RX ADMIN — Medication 1 APPLICATION(S): at 11:06

## 2018-01-01 RX ADMIN — Medication 10 MILLIGRAM(S): at 13:09

## 2018-01-01 RX ADMIN — ISOSORBIDE DINITRATE 20 MILLIGRAM(S): 5 TABLET ORAL at 21:41

## 2018-01-01 RX ADMIN — ISOSORBIDE DINITRATE 20 MILLIGRAM(S): 5 TABLET ORAL at 05:31

## 2018-01-01 RX ADMIN — MORPHINE SULFATE 2 MILLIGRAM(S): 50 CAPSULE, EXTENDED RELEASE ORAL at 15:35

## 2018-01-01 RX ADMIN — PROPOFOL 15.4 MICROGRAM(S)/KG/MIN: 10 INJECTION, EMULSION INTRAVENOUS at 23:21

## 2018-01-01 RX ADMIN — HEPARIN SODIUM 1200 UNIT(S)/HR: 5000 INJECTION INTRAVENOUS; SUBCUTANEOUS at 05:53

## 2018-01-01 RX ADMIN — TICAGRELOR 90 MILLIGRAM(S): 90 TABLET ORAL at 17:05

## 2018-01-01 RX ADMIN — PROPOFOL 15.4 MICROGRAM(S)/KG/MIN: 10 INJECTION, EMULSION INTRAVENOUS at 20:40

## 2018-01-01 RX ADMIN — CHLORHEXIDINE GLUCONATE 15 MILLILITER(S): 213 SOLUTION TOPICAL at 22:57

## 2018-01-01 RX ADMIN — MORPHINE SULFATE 2 MILLIGRAM(S): 50 CAPSULE, EXTENDED RELEASE ORAL at 16:35

## 2018-01-01 RX ADMIN — Medication 50 MILLIGRAM(S): at 22:12

## 2018-01-01 RX ADMIN — MIDAZOLAM HYDROCHLORIDE 2 MILLIGRAM(S): 1 INJECTION, SOLUTION INTRAMUSCULAR; INTRAVENOUS at 03:35

## 2018-01-01 RX ADMIN — Medication 1 APPLICATION(S): at 22:12

## 2018-01-01 RX ADMIN — ATORVASTATIN CALCIUM 80 MILLIGRAM(S): 80 TABLET, FILM COATED ORAL at 21:09

## 2018-01-01 RX ADMIN — Medication 1 APPLICATION(S): at 14:37

## 2018-01-01 RX ADMIN — ROBINUL 0.4 MILLIGRAM(S): 0.2 INJECTION INTRAMUSCULAR; INTRAVENOUS at 01:47

## 2018-01-01 RX ADMIN — ISOSORBIDE DINITRATE 10 MILLIGRAM(S): 5 TABLET ORAL at 08:44

## 2018-01-01 RX ADMIN — MORPHINE SULFATE 2 MILLIGRAM(S): 50 CAPSULE, EXTENDED RELEASE ORAL at 16:05

## 2018-01-01 RX ADMIN — TICAGRELOR 90 MILLIGRAM(S): 90 TABLET ORAL at 18:13

## 2018-01-01 RX ADMIN — ISOSORBIDE DINITRATE 5 MILLIGRAM(S): 5 TABLET ORAL at 21:52

## 2018-01-01 RX ADMIN — Medication 25 MILLIGRAM(S): at 08:44

## 2018-01-01 RX ADMIN — Medication 1 APPLICATION(S): at 17:20

## 2018-01-01 RX ADMIN — Medication 1 APPLICATION(S): at 21:41

## 2018-01-01 RX ADMIN — PROPOFOL 21.57 MICROGRAM(S)/KG/MIN: 10 INJECTION, EMULSION INTRAVENOUS at 16:34

## 2018-01-01 RX ADMIN — MORPHINE SULFATE 1 MG/HR: 50 CAPSULE, EXTENDED RELEASE ORAL at 13:52

## 2018-01-01 RX ADMIN — Medication 1 APPLICATION(S): at 01:46

## 2018-01-01 RX ADMIN — MORPHINE SULFATE 2 MILLIGRAM(S): 50 CAPSULE, EXTENDED RELEASE ORAL at 15:22

## 2018-01-01 RX ADMIN — PROPOFOL 15.4 MICROGRAM(S)/KG/MIN: 10 INJECTION, EMULSION INTRAVENOUS at 05:19

## 2018-01-01 RX ADMIN — Medication 1 APPLICATION(S): at 02:28

## 2018-01-01 RX ADMIN — PROPOFOL 15.4 MICROGRAM(S)/KG/MIN: 10 INJECTION, EMULSION INTRAVENOUS at 04:19

## 2018-01-01 RX ADMIN — Medication 400 MILLIGRAM(S): at 23:21

## 2018-01-01 RX ADMIN — Medication 1 APPLICATION(S): at 11:57

## 2018-01-01 RX ADMIN — HEPARIN SODIUM 5000 UNIT(S): 5000 INJECTION INTRAVENOUS; SUBCUTANEOUS at 22:12

## 2018-01-01 RX ADMIN — ATORVASTATIN CALCIUM 80 MILLIGRAM(S): 80 TABLET, FILM COATED ORAL at 21:52

## 2018-01-01 RX ADMIN — Medication 1 APPLICATION(S): at 23:34

## 2018-01-01 RX ADMIN — HEPARIN SODIUM 5000 UNIT(S): 5000 INJECTION INTRAVENOUS; SUBCUTANEOUS at 05:23

## 2018-06-22 NOTE — CHART NOTE - NSCHARTNOTEFT_GEN_A_CORE
====================  CCU MIDNIGHT ROUNDS  ====================    EFREN MEMBRENO  [id number]    ====================  SUMMARY: HPI:  48 y/o PMH of Asthma, HTN, and hx of substance abuse including cocaine, alcohol, marijuana coming in after cardiac arrest. Per EMS patient had been released from care home back in March and has not been compliant with  HTN meds. Patient was chocking ? vs. having SOB, he called EMS. When EMS arrived he collapsed and went into cardiac arrest (around 10:30pm), was intubated and ROSC was achieved by EMS after Epi x2 and bicarb x1  and he was transported to [facility]. BPs were found to be 200/120's, labetalol was given, BP continued to be elevated to 222/126  and he was placed on propofol drip, but that was discontinued, then was placed on nitroglycerin drip and bicarb drip. Patient was on a cooling protocol. Initial labs included lactate of 18, PCO2 of 145 and pH of <7. Patient also had episodes of desaturation on ventilation to 80s. Vent settings were PEEP 7, TV-500, RR-14, FiO2-100. Patient also with EKG changes with ST depression in lateral leads. At [facility] patient received , Brillinta 180mg, Heparin IVP 5000u, Lasix 80mg IVP. CTH was negative for acute bleed.   Patient was removed off propofol but patient had fixed gaze with no purposeful movement.  Patient was transferred to [facility] for further management of cardiac arrest. (22 Jun 2018 03:47)    ====================        ====================  NEW EVENTS: Pt therapeutic on hypothermia protocol. Sedated on propofol. Pupils constricted but minimally reactive to light b/l. UOP appropriate with Lasix BID.  ====================        ====================  VITALS (Last 12 hrs):  ====================    T(C): 32.8 (06-22-18 @ 21:00), Max: 33.4 (06-22-18 @ 10:00)  HR: 54 (06-22-18 @ 21:00) (54 - 64)  ABP: 92/62 (06-22-18 @ 21:00) (88/58 - 160/122)  ABP(mean): 72 (06-22-18 @ 21:00) (70 - 136)  RR: 17 (06-22-18 @ 21:00) (10 - 24)  SpO2: 98% (06-22-18 @ 21:00) (93% - 99%)    CVP(mm Hg): 1 (06-22-18 @ 21:00) (0 - 6)      TELEMETRY: Sinus anamaria      Mode: AC/ CMV (Assist Control/ Continuous Mandatory Ventilation)  RR (machine): 24  TV (machine): 500  FiO2: 40  PEEP: 7  ITime: 0.8  MAP: 12  PIP: 21      *BLOOD GAS/ARTERIAL/MIXED/VENOUS  *LACTATE    I&O's Summary    21 Jun 2018 07:01  -  22 Jun 2018 07:00  --------------------------------------------------------  IN: 272.1 mL / OUT: 3000 mL / NET: -2727.9 mL    22 Jun 2018 07:01  -  22 Jun 2018 21:44  --------------------------------------------------------  IN: 1252.9 mL / OUT: 2525 mL / NET: -1272.1 mL        ====================  PLAN:  ====================    #PEA arrest on hypothermia protocol from [facility], protocol resumed here, currently therapeutic.   - c/w Lasix 80 mg IVP BID   - c/w isordil/hydralazine for afterload reduction  - c/w asa, brilinta, hep GTT, atorvastatin for ACS  - once rewarmed and following commands, will plan for LHC  - UDS negative    Thierry Nina, CCU PA   #[id number]

## 2018-06-22 NOTE — PROCEDURE NOTE - NSPROCDETAILS_GEN_ALL_CORE
guidewire recovered/sterile technique, catheter placed/lumen(s) aspirated and flushed/sterile dressing applied/ultrasound guidance
guidewire recovered/lumen(s) aspirated and flushed/ultrasound guidance/sterile dressing applied/sterile technique, catheter placed

## 2018-06-22 NOTE — H&P ADULT - ATTENDING COMMENTS
Patient with history as above presents with cardiac arrest.  Continue hypothermia protocol and supportive care.

## 2018-06-22 NOTE — PROCEDURE NOTE - PROCEDURE
<<-----Click on this checkbox to enter Procedure Central line placement  06/22/2018    Active  PDEYFOY

## 2018-06-22 NOTE — H&P ADULT - NSHPSOURCEINFOTX_GEN_ALL_CORE
History provided by EMS, family and Dr. Monique from [facility] History provided by EMS, family and Dr. Monique from [facility] [phone]

## 2018-06-22 NOTE — H&P ADULT - ASSESSMENT
31 y/o PMH of Asthma, HTN, and hx of substance abuse including cocaine, alcohol, marijuana coming in after cardiac arrest.     CV:  1) Cardiac arrest     2) Hypertension    Resp:  -intubated and sedated       Neuro:  -sedated on propophol      Endo:    ID: 29 y/o PMH of Asthma, HTN, and hx of substance abuse including cocaine, alcohol, marijuana coming in after cardiac arrest after food aspiration     CV:  1) Cardiac arrest   -s/p ASA, Brilinta and Heparin bolus  -Echo   -Cardiac enzymes  -will obtain lactate and BMP to assess for acidemia         2) Hypertension  -SBP in the 180's on admission   -s/p isordil drip  -monitor BB and titrate antihypertensives     Resp:  -pt with hx of asthma   -intubated and sedated   -patient with evidence of pulmonary edema  -continue with diuresis         Neuro:  -sedated on propofol  -neuro checks  -will need further neuro checks   -sedation vacation          Endo:  -TSH, HgA1C, lipid panel ordered     ID:  - No active issues    DVT ppx:  SQH 31 y/o PMH of Asthma, HTN, and hx of substance abuse including cocaine, alcohol, marijuana coming in after cardiac arrest after food aspiration     CV:  1) Cardiac arrest   - was on hypothermia protocol on [facility], arrest was around 10:30pm   -will be placed on hypothermia protocol here    2) NSTEMI   -s/p ASA, Brilinta and Heparin bolus  -Echo   -Cardiac enzymes  -BMP        2) Hypertension  -SBP in the 180's on admission   -s/p isordil drip, now on propofol       Resp:  -pt with hx of asthma   -intubated and sedated   -patient with evidence of pulmonary edema  -continue with diuresis with lasix 80 TID          Neuro:  -sedated on propofol  -neuro checks  -will need further neuro checks   -sedation vacation          Endo:  -TSH, HgA1C, lipid panel ordered     ID:  - No active issues    DVT ppx:  SQH 31 y/o PMH of Asthma, HTN, and hx of substance abuse including cocaine, alcohol, marijuana coming in after witnessed cardiac arrest by EMS after chocking? SOB? transferred from [facility].     CV:  1) Cardiac arrest   - was on hypothermia protocol on [facility], arrest was around 10:30pm   -will be placed on hypothermia protocol here  -will get central line     2) NSTEMI   -s/p ASA, Brilinta and Heparin bolus at [facility]  -cont on ASA, Brilinta and heparin drip  -high intensity statin   -cath when clinical condition improves  -Echo ordered  -bedside echo showed severe LV systolic dysfunction  -Trend Cardiac enzymes, high sensitivity trop 353 with CK elevation       3) Hypertension  -SBP in the 180's on admission   -on nitroglycerin drip, titrate to MAP of 65       Resp:  -pt with hx of asthma   -intubated and sedated   -patient with evidence of pulmonary edema  -continue with diuresis with lasix 80 BID  -Trend I/Os, monitor ABGs    Renal:  -LALITA likely prerenal after cardiac arrest  -trend Scr and I/Os    Neuro:  -sedated on propofol  -will need further neuro checks   -sedation vacation    -CTH at [facility] was negative for acute pathology  -will assess mentation after clinical condition improves       Endo:  -TSH, HgA1C, lipid panel ordered     ID:  - No active issues    Heme:  -leukocytosis mild  -Hg stable     DVT ppx:  SQH 29 y/o PMH of Asthma, HTN, and hx of substance abuse including cocaine, alcohol, marijuana coming in after witnessed cardiac arrest by EMS after chocking? SOB? transferred from [facility].     CV:  1) Cardiac arrest   - was on hypothermia protocol on [facility], arrest was around 10:30pm   -will be placed on hypothermia protocol here  -will get central line     2) NSTEMI   -s/p ASA, Brilinta and Heparin bolus at [facility]  -cont on ASA, Brilinta and heparin drip  -high intensity statin   -cath when clinical condition improves  -Echo ordered  -bedside echo showed severe LV systolic dysfunction  -Trend Cardiac enzymes, high sensitivity trop 353 with CK elevation       3) Hypertension  -SBP in the 180's on admission   -continue monitoring, off nitro drip        Resp:  -pt with hx of asthma   -intubated and sedated   -patient with evidence of pulmonary edema  -continue with diuresis with lasix 80 BID  -Trend I/Os, monitor ABGs    Renal:  -LALITA likely prerenal after cardiac arrest  -trend Scr and I/Os    Neuro:  -sedated on propofol  -will need further neuro checks   -sedation vacation    -CTH at [facility] was negative for acute pathology  -will assess mentation after clinical condition improves       Endo:  -TSH, HgA1C, lipid panel ordered     ID:  - No active issues    Heme:  -leukocytosis mild  -Hg stable     DVT ppx:  SQH 48 y/o PMH of Asthma, HTN, and hx of substance abuse including cocaine, alcohol, marijuana coming in after witnessed cardiac arrest by EMS after chocking? SOB? transferred from [facility].     CV:  1) Cardiac arrest   - was on hypothermia protocol on [facility], arrest was around 10:30pm   -will be placed on hypothermia protocol here  -will get central line     2) NSTEMI   -s/p ASA, Brilinta and Heparin bolus at [facility]  -cont on ASA, Brilinta and heparin drip  -high intensity statin   -cath when clinical condition improves  -Echo ordered  -bedside echo showed severe LV systolic dysfunction  -Trend Cardiac enzymes, high sensitivity trop 353 with CK elevation       3) Hypertension  -SBP in the 180's on admission   -continue monitoring, off nitro drip        Resp:  -pt with hx of asthma   -intubated and sedated   -patient with evidence of pulmonary edema  -continue with diuresis with lasix 80 BID  -Trend I/Os, monitor ABGs    Renal:  -LALITA likely prerenal after cardiac arrest  -trend Scr and I/Os    Neuro:  -sedated on propofol  -will need further neuro checks   -sedation vacation    -CTH at [facility] was negative for acute pathology  -will assess mentation after clinical condition improves       Endo:  -TSH, HgA1C, lipid panel ordered     ID:  - No active issues    Heme:  -leukocytosis mild  -Hg stable     DVT ppx:  SQH 48 y/o PMH of Asthma, HTN, and hx of substance abuse including cocaine, alcohol, marijuana coming in after witnessed cardiac arrest by EMS after chocking? SOB? transferred from [facility].     CV:  1) Cardiac arrest   - was on hypothermia protocol on [facility], arrest was around 10:30pm   -will be placed on hypothermia protocol here  -will get central line     2) ACS, ST depressions on lateral leads   -s/p ASA, Brilinta and Heparin bolus at [facility]  -cont on ASA, Brilinta and heparin drip  -high intensity statin   -cath when clinical condition improves  -Echo ordered  -bedside echo showed severe LV systolic dysfunction  -Trend Cardiac enzymes, high sensitivity trop 353 with CK elevation       3) Hypertension  -SBP in the 180's on admission   -continue monitoring, off nitro drip        Resp:  -pt with hx of asthma   -intubated and sedated   -patient with evidence of pulmonary edema  -continue with diuresis with lasix 80 BID  -Trend I/Os, monitor ABGs    Renal:  -LALITA likely prerenal after cardiac arrest  -trend Scr and I/Os    Neuro:  -sedated on propofol  -will need further neuro checks   -sedation vacation    -CTH at [facility] was negative for acute pathology  -will assess mentation after clinical condition improves       Endo:  -TSH, HgA1C, lipid panel ordered     ID:  - No active issues    Heme:  -leukocytosis mild  -Hg stable     DVT ppx:  SQH 48 y/o PMH of Asthma, HTN, and hx of substance abuse including cocaine, alcohol, marijuana coming in after witnessed cardiac arrest by EMS after chocking? SOB? transferred from [facility].     CV:  1) Cardiac arrest   - was on hypothermia protocol on [facility], arrest was around 10:30pm   -will be placed on hypothermia protocol here  -will get central line     2) ACS, ST depressions on lateral leads   -s/p ASA, Brilinta and Heparin bolus at [facility]  -cont on ASA, Brilinta and heparin drip  -high intensity statin   -cath when clinical condition improves  -Echo ordered  -bedside echo showed severe LV systolic dysfunction  -Trend Cardiac enzymes, high sensitivity trop 353 with CK elevation       3) Hypertension  -SBP in the 180's on admission   -continue monitoring, off nitro drip        Resp:  -pt with hx of asthma   -intubated and sedated   -patient with evidence of pulmonary edema  -continue with diuresis with lasix 80 BID  -Trend I/Os, monitor ABGs    Renal:  -LALITA likely prerenal after cardiac arrest  -trend Scr and I/Os    Neuro:  -sedated on propofol  -will need further neuro checks   -sedation vacation    -CTH at [facility] was negative for acute pathology  -will assess mentation after clinical condition improves       Endo:  -TSH, HgA1C, lipid panel ordered     ID:  - No active issues    Heme:  -leukocytosis mild  -Hg stable     DVT ppx:  heparin ggt

## 2018-06-22 NOTE — PROCEDURE NOTE - NSPOSTCAREGUIDE_GEN_A_CORE
Care for catheter as per unit/ICU protocols/Keep the cast/splint/dressing clean and dry
Care for catheter as per unit/ICU protocols

## 2018-06-22 NOTE — H&P ADULT - HISTORY OF PRESENT ILLNESS
29 y/o PMH of Asthma, HTN, and hx of substance abuse including cocaine, alcohol, marijuana coming in after cardiac arrest. Per EMS patient had been released from longterm back in March and has not been compliant with  HTN meds. Patient was chocking on food when he collapsed and went into cardiac arrest, ROSC was achieved and he was transported to [facility]. Vitals recorded there were /145 Hr 118, SPO2 86 on vent.  Patient also had episodes of desaturation on ventilation to 80s. Vent settings were PEEP 7, TV-500, RR-14, FiO2-100. At [facility] patient received , Brillinta 180mg, Heparin IVP 5000u, Lasix 80mg IVP, 2amps of bicarb and placed on Bicarb drip. CTH was negative for acute bleed.  Patient was intubated and sedated with propofol.  Patient was removed off propofol but patient had fixed gaze with no purposeful movement.  Patient was transferred to [facility] for further management of cardiac arrest. 31 y/o PMH of Asthma, HTN, and hx of substance abuse including cocaine, alcohol, marijuana coming in after cardiac arrest. Per EMS patient had been released from [facility] back in March and has not been compliant with  HTN meds. Patient was chocking ? vs. having SOB, he called EMS. When EMS arrived he collapsed and went into cardiac arrest, was intubated and ROSC was achieved by EMS after Epi x2 and bicarb x1  and he was transported to [facility]. BPs were found to be 200/120's, labetalol was given, BP continued to be elevated to 222/126  and he was placed on propofol drip, but that was discontinued, then was placed on nitroglycerin drip and bicarb drip.  Initial labs included lactate of 18, PCO2 of 145 and pH of <7. Patient also had episodes of desaturation on ventilation to 80s. Vent settings were PEEP 7, TV-500, RR-14, FiO2-100. At [facility] patient received , Brillinta 180mg, Heparin IVP 5000u, Lasix 80mg IVP. CTH was negative for acute bleed.   Patient was removed off propofol but patient had fixed gaze with no purposeful movement.  Patient was transferred to [facility] for further management of cardiac arrest. 31 y/o PMH of Asthma, HTN, and hx of substance abuse including cocaine, alcohol, marijuana coming in after cardiac arrest. Per EMS patient had been released from nursing home back in March and has not been compliant with  HTN meds. Patient was chocking ? vs. having SOB, he called EMS. When EMS arrived he collapsed and went into cardiac arrest (around 10:30pm), was intubated and ROSC was achieved by EMS after Epi x2 and bicarb x1  and he was transported to [facility]. BPs were found to be 200/120's, labetalol was given, BP continued to be elevated to 222/126  and he was placed on propofol drip, but that was discontinued, then was placed on nitroglycerin drip and bicarb drip. Patient was on a cooling protocol. Initial labs included lactate of 18, PCO2 of 145 and pH of <7. Patient also had episodes of desaturation on ventilation to 80s. Vent settings were PEEP 7, TV-500, RR-14, FiO2-100. Patient also with EKG changes with ST depression in lateral leads. At [facility] patient received , Brillinta 180mg, Heparin IVP 5000u, Lasix 80mg IVP. CTH was negative for acute bleed.   Patient was removed off propofol but patient had fixed gaze with no purposeful movement.  Patient was transferred to [facility] for further management of cardiac arrest. 48 y/o PMH of Asthma, HTN, and hx of substance abuse including cocaine, alcohol, marijuana coming in after cardiac arrest. Per EMS patient had been released from senior care back in March and has not been compliant with  HTN meds. Patient was chocking ? vs. having SOB, he called EMS. When EMS arrived he collapsed and went into cardiac arrest (around 10:30pm), was intubated and ROSC was achieved by EMS after Epi x2 and bicarb x1  and he was transported to [facility]. BPs were found to be 200/120's, labetalol was given, BP continued to be elevated to 222/126  and he was placed on propofol drip, but that was discontinued, then was placed on nitroglycerin drip and bicarb drip. Patient was on a cooling protocol. Initial labs included lactate of 18, PCO2 of 145 and pH of <7. Patient also had episodes of desaturation on ventilation to 80s. Vent settings were PEEP 7, TV-500, RR-14, FiO2-100. Patient also with EKG changes with ST depression in lateral leads. At [facility] patient received , Brillinta 180mg, Heparin IVP 5000u, Lasix 80mg IVP. CTH was negative for acute bleed.   Patient was removed off propofol but patient had fixed gaze with no purposeful movement.  Patient was transferred to [facility] for further management of cardiac arrest.

## 2018-06-23 NOTE — PROGRESS NOTE ADULT - ASSESSMENT
46 y/o PMH of Asthma, HTN, and hx of substance abuse including cocaine, alcohol, marijuana coming in after witnessed cardiac arrest by EMS after chocking? SOB? transferred from [facility].     CV:  1) Cardiac arrest   - was on hypothermia protocol on [facility], arrest was around 10:30pm   -will be placed on hypothermia protocol here  -will get central line     2) ACS, ST depressions on lateral leads   -s/p ASA, Brilinta and Heparin bolus at [facility]  -cont on ASA, Brilinta and heparin drip  -high intensity statin   -cath when clinical condition improves  -Echo ordered  -bedside echo showed severe LV systolic dysfunction  -Trend Cardiac enzymes, high sensitivity trop 353 with CK elevation       3) Hypertension  -SBP in the 180's on admission   -continue monitoring, off nitro drip        Resp:  -pt with hx of asthma   -intubated and sedated   -patient with evidence of pulmonary edema  -continue with diuresis with lasix 80 BID  -Trend I/Os, monitor ABGs    Renal:  -LALITA likely prerenal after cardiac arrest  -trend Scr and I/Os    Neuro:  -sedated on propofol  -will need further neuro checks   -sedation vacation    -CTH at [facility] was negative for acute pathology  -will assess mentation after clinical condition improves       Endo:  -TSH, HgA1C, lipid panel ordered     ID:  - No active issues    Heme:  -leukocytosis mild  -Hg stable     DVT ppx:  heparin ggt

## 2018-06-23 NOTE — CHART NOTE - NSCHARTNOTEFT_GEN_A_CORE
====================  CCU MIDNIGHT ROUNDS  ====================    EFREN MEMBRENO  [id number]  Patient is a 47y old  Male who presents with a chief complaint of cardiac arrest (22 Jun 2018 03:47)      ====================  SUMMARY: 48 y/o PMH of Asthma, HTN, and hx of substance abuse including cocaine, alcohol, marijuana coming in after cardiac arrest. Per EMS patient had been released from senior care back in March and has not been compliant with  HTN meds. Patient was chocking ? vs. having SOB, he called EMS. When EMS arrived he collapsed and went into cardiac arrest (around 10:30pm), was intubated and ROSC was achieved by EMS after Epi x2 and bicarb x1  and he was transported to [facility]. BPs were found to be 200/120's, labetalol was given, BP continued to be elevated to 222/126  and he was placed on propofol drip, but that was discontinued, then was placed on nitroglycerin drip and bicarb drip. Patient was on a cooling protocol. Initial labs included lactate of 18, PCO2 of 145 and pH of <7. Patient also had episodes of desaturation on ventilation to 80s. Vent settings were PEEP 7, TV-500, RR-14, FiO2-100. Patient also with EKG changes with ST depression in lateral leads. At [facility] patient received , Brillinta 180mg, Heparin IVP 5000u, Lasix 80mg IVP. CTH was negative for acute bleed.   Patient was removed off propofol but patient had fixed gaze with no purposeful movement. Patient now on cooling protocol.  ====================        ====================  NEW EVENTS: Patient's CVP is in the 2's. Diuretic being held. EEG showed severe diffuse encephalopathy, no epileptiform activity.  ====================        ====================  VITALS (Last 12 hrs):  ====================    T(C): 36.4 (06-23-18 @ 21:00), Max: 36.4 (06-23-18 @ 21:00)  T(F): 97.5 (06-23-18 @ 21:00), Max: 97.5 (06-23-18 @ 21:00)  HR: 86 (06-23-18 @ 21:30) (62 - 86)  BP: 111/54 (06-23-18 @ 19:00) (111/54 - 111/54)  BP(mean): 72 (06-23-18 @ 19:00) (72 - 72)  ABP: 108/60 (06-23-18 @ 21:30) (100/60 - 124/72)  ABP(mean): 78 (06-23-18 @ 21:30) (74 - 92)  RR: 24 (06-23-18 @ 21:30) (24 - 25)  SpO2: 97% (06-23-18 @ 21:30) (95% - 98%)  Wt(kg): --  CVP(mm Hg): 2 (06-23-18 @ 21:30) (1 - 5)  CVP(cm H2O): --  CO: --  CI: --  PA: --  PA(mean): --  PCWP: --  SVR: --  PVR: --    I&O's Summary    22 Jun 2018 07:01 - 23 Jun 2018 07:00  --------------------------------------------------------  IN: 1968.4 mL / OUT: 4080 mL / NET: -2111.6 mL    23 Jun 2018 07:01  -  23 Jun 2018 22:34  --------------------------------------------------------  IN: 925.5 mL / OUT: 740 mL / NET: 185.5 mL        Mode: AC/ CMV (Assist Control/ Continuous Mandatory Ventilation)  RR (machine): 24  TV (machine): 500  FiO2: 35  PEEP: 5  ITime: 0.8  MAP: 12  PIP: 22      ====================  NEW LABS:  ====================                          13.5   14.4  )-----------( 265      ( 23 Jun 2018 17:07 )             41.3     06-23    141  |  102  |  22  ----------------------------<  115<H>  3.5   |  25  |  1.00    Ca    8.5      23 Jun 2018 17:07  Phos  3.2     06-23  Mg     1.9     06-23    TPro  6.7  /  Alb  3.5  /  TBili  0.3  /  DBili  x   /  AST  96<H>  /  ALT  79<H>  /  AlkPhos  77  06-23    PT/INR - ( 23 Jun 2018 17:07 )   PT: 11.6 sec;   INR: 1.07 ratio         PTT - ( 23 Jun 2018 17:07 )  PTT:51.8 sec      ABG - ( 23 Jun 2018 16:54 )  pH, Arterial: 7.44  pH, Blood: x     /  pCO2: 42    /  pO2: 92    / HCO3: 28    / Base Excess: 3.6   /  SaO2: 98                    ====================  PLAN: 48 y/o PMH of Asthma, HTN, and hx of substance abuse including cocaine, alcohol, marijuana coming in after witnessed cardiac arrest by EMS after chocking? SOB? transferred from [facility].     CV:  1) Cardiac arrest   - was on hypothermia protocol on [facility], arrest was around 10:30pm 6/21  -will be placed on hypothermia protocol here, now rewarming      2) ACS, ST depressions on lateral leads   -s/p ASA, Brilinta and Heparin bolus at [facility]  -cont on ASA, Brilinta and heparin drip  -high intensity statin   Echo showed EF (Visual Estimate): 20-25 %,  Severe global hypokinesia.  Concentric left ventricular hypertrophy.  -cath when clinical condition improves        3) Hypertension  -SBP in the 180's on admission   -now closely monitoring as pt with soft BPs and CVP in the 2s  -continue on hydralazine and isordil drip, may down titrate propofol if BPs drop         Resp:  -pt with hx of asthma   -intubated and sedated   -patient with evidence of pulmonary edema  -was on diuresis with lasix 80 BID, but CVP dropped. Will monitor CVP and diurese accordingly   -Trend I/Os, monitor ABGs    Renal:  -LALITA likely prerenal after cardiac arrest  -Scr down trended   -I/Os     Neuro:  -sedated on propofol  -neuro checks  -F/u EEG  -sedation vacation    -CTH at [facility] was negative for acute pathology  -will assess mentation after clinical condition improves       Endo:  -TSH- 0.05 (low), HgA1C- 6.3, lipid panel showed triglycerides to 709    ID:  - No active issues    Heme:  -leukocytosis mild  -Hg stable     DVT ppx:  heparin ggt          ====================

## 2018-06-23 NOTE — PROGRESS NOTE ADULT - SUBJECTIVE AND OBJECTIVE BOX
Admission date: 18  CHIEF COMPLAINT:  HPI:  48 y/o PMH of Asthma, HTN, and hx of substance abuse including cocaine, alcohol, marijuana coming in after cardiac arrest. Per EMS patient had been released from [facility] back in March and has not been compliant with  HTN meds. Patient was chocking ? vs. having SOB, he called EMS. When EMS arrived he collapsed and went into cardiac arrest (around 10:30pm), was intubated and ROSC was achieved by EMS after Epi x2 and bicarb x1  and he was transported to [facility]. BPs were found to be 200/120's, labetalol was given, BP continued to be elevated to 222/126  and he was placed on propofol drip, but that was discontinued, then was placed on nitroglycerin drip and bicarb drip. Patient was on a cooling protocol. Initial labs included lactate of 18, PCO2 of 145 and pH of <7. Patient also had episodes of desaturation on ventilation to 80s. Vent settings were PEEP 7, TV-500, RR-14, FiO2-100. Patient also with EKG changes with ST depression in lateral leads. At [facility] patient received , Brillinta 180mg, Heparin IVP 5000u, Lasix 80mg IVP. CTH was negative for acute bleed.   Patient was removed off propofol but patient had fixed gaze with no purposeful movement.  Patient was transferred to [facility] for further management of cardiac arrest. (2018 03:47)    INTERVAL HISTORY:    REVIEW OF SYSTEMS: Denies intubated and sedated    MEDICATIONS  (STANDING):  aspirin  chewable 81 milliGRAM(s) Oral daily  atorvastatin 80 milliGRAM(s) Oral at bedtime  furosemide   Injectable 80 milliGRAM(s) IV Push every 12 hours  heparin  Infusion.  Unit(s)/Hr (10 mL/Hr) IV Continuous <Continuous>  hydrALAZINE 10 milliGRAM(s) Oral every 8 hours  isosorbide   dinitrate Tablet (ISORDIL) 5 milliGRAM(s) Oral every 8 hours  midazolam Infusion 0.02 mG/kG/Hr (2.054 mL/Hr) IV Continuous <Continuous>  potassium chloride  20 mEq/100 mL IVPB 20 milliEquivalent(s) IV Intermittent once  potassium chloride  20 mEq/100 mL IVPB 20 milliEquivalent(s) IV Intermittent every 2 hours  propofol Infusion 35 MICROgram(s)/kG/Min (21.567 mL/Hr) IV Continuous <Continuous>  ticagrelor 90 milliGRAM(s) Oral two times a day    MEDICATIONS  (PRN):  heparin  Injectable 4000 Unit(s) IV Push every 6 hours PRN For aPTT less than 40      Objective:  ICU Vital Signs Last 24 Hrs  T(C): 32.8 (2018 06:00), Max: 35.5 (2018 07:00)  T(F): 91 (2018 06:00), Max: 95.9 (2018 07:00)  HR: 58 (2018 06:15) (52 - 82)  BP: 134/85 (2018 08:22) (134/85 - 134/85)  BP(mean): 106 (2018 08:22) (106 - 106)  ABP: 106/66 (2018 06:15) (88/58 - 160/122)  ABP(mean): 80 (2018 06:15) (70 - 136)  RR: 24 (2018 06:15) (10 - 47)  SpO2: 93% (2018 06:15) (93% - 100%)    Mode: AC/ CMV (Assist Control/ Continuous Mandatory Ventilation)  RR (machine): 24  TV (machine): 500  FiO2: 35  PEEP: 5  ITime: 0.8  MAP: 11  PIP: 20    Adult Advanced Hemodynamics Last 24 Hrs  CVP(mm Hg): 0 (2018 06:15) (-4 - 6)  CVP(cm H2O): --  CO: --  CI: --  PA: --  PA(mean): --  PCWP: --  SVR: --  SVRI: --  PVR: --  PVRI: --       @ 07: @ 07:00  --------------------------------------------------------  IN: 272.1 mL / OUT: 3000 mL / NET: -2727.9 mL     @ 07:01  -   @ 06:49  --------------------------------------------------------  IN: 1933.6 mL / OUT: 3730 mL / NET: -1796.4 mL      Daily Height in cm: 182.88 (2018 07:00)    Daily Weight in k.4 (2018 06:15)    PHYSICAL EXAM:    General: WN/WD NAD  Neurology: Awake, nonfocal, CROWLEY x 4  Eyes: Scleras clear, PERRLA/ EOMI, Gross vision intact  ENT:Gross hearing intact, grossly patent pharynx, no stridor  Neck: Neck supple, trachea midline, No JVD,   Respiratory: CTA B/L, No wheezing, rales, rhonchi  CV: RRR, S1S2, no murmurs, rubs or gallops  Abdominal: Soft, NT, ND +BS,   Extremities: No edema, + peripheral pulses  Skin: No Rashes, Hematoma, Ecchymosis  Lymphatic: No Neck, axilla, groin LAD  Psych: Oriented x 3, normal affect  Incisions:  Right Fem A-Line; Left Fem Swanganz and IABP  Tubes:  ET Tube; OGT  TELEMETRY:     EKG:     IMAGING:  < from: Transthoracic Echocardiogram (18 @ 04:25) >  EF (Visual Estimate): 20-25 %  ------------------------------------------------------------------------  Observations:  Mitral Valve: Mitral annular calcification. Mild mitral  regurgitation.  Aortic Valve/Aorta: Aortic valve not well visualized;  probably normal. Trivial aortic regurgitation.  Normal aortic root.  Left Atrium: Normal left atrium.  Left Ventricle: Left ventricular ejection fraction, by  visual estimation, is 20-25%.  Severe global hypokinesia.  Concentric left ventricular hypertrophy.  Right Heart: Normal right atrium. Normal right ventricular  size and function. Normal tricuspid valve. Normal pulmonic  valve. Trivial pulmonic regurgitation.  Pericardium/Pleura: Normal pericardium with no pericardial  effusion.  Hemodynamic: Estimated right atrial pressure is 8 mm Hg.  ------------------------------------------------------------------------  Conclusions:  1. Mild left atrial enlargement.  2. Concentric left ventricular hypertrophy.  3. Left ventricular ejection fraction, by visual  estimation, is 20-25%.  Severe global hypokinesia.  4. Normal right atrium.  5. Normal right ventricular size and function.  6. No significantvalvular abnormalities.  7. Normal pericardium with no pericardial effusion.  Findings discussed with cardiology fellow at 5:35am.    < end of copied text >    Labs:  ABG - ( 2018 05:30 )  pH, Arterial: 7.42  pH, Blood: x     /  pCO2: 41    /  pO2: 88    / HCO3: 26    / Base Excess: 1.9   /  SaO2: 97                                13.7   14.9  )-----------( 261      ( 2018 05:36 )             42.7         140  |  103  |  20  ----------------------------<  122<H>  3.3<L>   |  23  |  0.93    Ca    8.4      2018 05:36  Phos  2.8       Mg     2.1         TPro  6.8  /  Alb  3.4  /  TBili  0.3  /  DBili  x   /  AST  103<H>  /  ALT  83<H>  /  AlkPhos  81      LIVER FUNCTIONS - ( 2018 05:36 )  Alb: 3.4 g/dL / Pro: 6.8 g/dL / ALK PHOS: 81 U/L / ALT: 83 U/L / AST: 103 U/L / GGT: x           PT/INR - ( 2018 05:36 )   PT: 12.1 sec;   INR: 1.11 ratio         PTT - ( 2018 05:36 )  PTT:82.2 sec  Creatine Kinase, Serum: 2970 U/L ( @ 16:54)  CKMB Units: 61.7 ng/mL ( @ 16:54)  Creatine Kinase, Serum: 3386 U/L ( @ 11:54)  CKMB Units: 65.5 ng/mL ( @ 11:54)  Creatine Kinase, Serum: 3479 U/L ( @ 09:39)  CKMB Units: 64.8 ng/mL ( @ 09:39)      HEALTH ISSUES - PROBLEM Dx:

## 2018-06-24 NOTE — CONSULT NOTE ADULT - ATTENDING COMMENTS
46 y/o PMH of Asthma, HTN, and hx of substance abuse including cocaine, alcohol, marijuana coming in after witnessed cardiac arrest by EMS  transferred from [facility].  Neurological exam significant for no oculocephalic reflex, no corneal reflex, intact gag/cough reflex and pupillary reaction to light but no spontaneous movement with flaccid areflexia CTH  appears to have loss of gray-white differentiation with severe edema c/w severe anoxic encephalopathy but intact brainstem reflexes.    Plan:  - clinical presentation with guarded prognosis for meaningful neurological recovery.

## 2018-06-24 NOTE — PROGRESS NOTE ADULT - ASSESSMENT
46 y/o PMH of Asthma, HTN, and hx of substance abuse including cocaine, alcohol, marijuana coming in after witnessed cardiac arrest by EMS after chocking? SOB? transferred from [facility].     CV:  1) Cardiac arrest   - was on hypothermia protocol on [facility], arrest was around 10:30pm   -will be placed on hypothermia protocol here  -will get central line     2) ACS, ST depressions on lateral leads   -s/p ASA, Brilinta and Heparin bolus at [facility]  -cont on ASA, Brilinta and heparin drip  -high intensity statin   -cath when clinical condition improves  -Echo ordered  -bedside echo showed severe LV systolic dysfunction  -Trend Cardiac enzymes, high sensitivity trop 353 with CK elevation       3) Hypertension  -SBP in the 180's on admission   -continue monitoring, off nitro drip        Resp:  -pt with hx of asthma   -intubated and sedation vacation  -patient with evidence of pulmonary edema  -continue with diuresis with lasix 80 BID  -Trend I/Os, monitor ABGs    Renal:  -LALITA likely prerenal after cardiac arrest  -trend Scr and I/Os    Neuro:  -sedation vacation  -will need further neuro checks   -sedation vacation    -CTH at [facility] was negative for acute pathology  -will assess mentation after clinical condition improves   -6/23 EEG provides evidence of a severe diffuse encephalopathy, with a generalized burst suppression pattern seen throughout the recording.  No epileptiform abnormalities were recorded      Endo:  -TSH, HgA1C, lipid panel ordered     GI:   Hypoactive BS improving 6/23 started Jevity TF with goal rate of 60    ID:  - No active issues    Heme:  -leukocytosis mild  -Hg stable     DVT ppx:  heparin ggt

## 2018-06-24 NOTE — PROGRESS NOTE ADULT - SUBJECTIVE AND OBJECTIVE BOX
Admission date: 18  CHIEF COMPLAINT: HPI:  48 y/o PMH of Asthma, HTN, and hx of substance abuse including cocaine, alcohol, marijuana coming in after cardiac arrest. Per EMS patient had been released from long term back in March and has not been compliant with  HTN meds. Patient was chocking ? vs. having SOB, he called EMS. When EMS arrived he collapsed and went into cardiac arrest (around 10:30pm), was intubated and ROSC was achieved by EMS after Epi x2 and bicarb x1  and he was transported to [facility]. BPs were found to be 200/120's, labetalol was given, BP continued to be elevated to 222/126  and he was placed on propofol drip, but that was discontinued, then was placed on nitroglycerin drip and bicarb drip. Patient was on a cooling protocol. Initial labs included lactate of 18, PCO2 of 145 and pH of <7. Patient also had episodes of desaturation on ventilation to 80s. Vent settings were PEEP 7, TV-500, RR-14, FiO2-100. Patient also with EKG changes with ST depression in lateral leads. At [facility] patient received , Brillinta 180mg, Heparin IVP 5000u, Lasix 80mg IVP. CTH was negative for acute bleed.   Patient was removed off propofol but patient had fixed gaze with no purposeful movement.  Patient was transferred to [facility] for further management of cardiac arrest. (2018 03:47)    INTERVAL HISTORY: 24 hr EEG overnight found to have no seizures EEG severe diffuse encephalopathy,  No epileptiform abnormalities were recorded.      REVIEW OF SYSTEMS: sedated and intubated    MEDICATIONS  (STANDING):  aspirin  chewable 81 milliGRAM(s) Oral daily  atorvastatin 80 milliGRAM(s) Oral at bedtime  chlorhexidine 0.12% Liquid 15 milliLiter(s) Swish and Spit two times a day  erythromycin   Ointment 1 Application(s) Both EYES every 4 hours  furosemide   Injectable 80 milliGRAM(s) IV Push every 12 hours  heparin  Infusion.  Unit(s)/Hr (10 mL/Hr) IV Continuous <Continuous>  hydrALAZINE 10 milliGRAM(s) Oral every 8 hours  isosorbide   dinitrate Tablet (ISORDIL) 5 milliGRAM(s) Oral every 8 hours  midazolam Infusion 0.02 mG/kG/Hr (2.054 mL/Hr) IV Continuous <Continuous>  petrolatum Ophthalmic Ointment 1 Application(s) Both EYES every 6 hours  propofol Infusion 35 MICROgram(s)/kG/Min (21.567 mL/Hr) IV Continuous <Continuous>  ticagrelor 90 milliGRAM(s) Oral two times a day    MEDICATIONS  (PRN):  artificial  tears Solution 1 Drop(s) Both EYES every 4 hours PRN Dry Eyes  heparin  Injectable 4000 Unit(s) IV Push every 6 hours PRN For aPTT less than 40    Objective:  ICU Vital Signs Last 24 Hrs  T(C): 36.7 (2018 06:00), Max: 37.2 (2018 00:00)  T(F): 98.1 (2018 06:00), Max: 99 (2018 00:00)  HR: 82 (2018 06:30) (56 - 92)  BP: 111/54 (2018 19:00) (103/61 - 111/54)  BP(mean): 72 (2018 19:00) (68 - 72)  ABP: 120/70 (2018 06:30) (90/58 - 124/72)  ABP(mean): 86 (2018 06:30) (68 - 92)  RR: 28 (2018 06:30) (23 - 28)  SpO2: 97% (2018 06:30) (95% - 99%)    Mode: AC/ CMV (Assist Control/ Continuous Mandatory Ventilation)  RR (machine): 24  TV (machine): 500  FiO2: 35  PEEP: 5  ITime: 0.8  MAP: 10  PIP: 21    Adult Advanced Hemodynamics Last 24 Hrs  CVP(mm Hg): 2 (2018 06:30) (1 - 5)  CVP(cm H2O): --  CO: --  CI: --  PA: --  PA(mean): --  PCWP: --  SVR: --  SVRI: --  PVR: --  PVRI: --       @ 07:01  -   @ 07:00  --------------------------------------------------------  IN: 1520.5 mL / OUT: 1160 mL / NET: 360.5 mL    Daily Weight in k.7 (2018 05:00)    PHYSICAL EXAM:    General: WN/WD NAD  Neurology: Awake, nonfocal, CROWLEY x 4  Eyes: Scleras clear, PERRLA/ EOMI, Gross vision intact  ENT:Gross hearing intact, grossly patent pharynx, no stridor  Neck: Neck supple, trachea midline, No JVD,   Respiratory: CTA B/L, No wheezing, rales, rhonchi  CV: RRR, S1S2, no murmurs, rubs or gallops  Abdominal: Soft, NT, ND +BS,   Extremities: No edema, + peripheral pulses  Skin: No Rashes, Hematoma, Ecchymosis  Lymphatic: No Neck, axilla, groin LAD  Psych: Oriented x 3, normal affect  Incisions:  Right Fem A-Line; Left Fem Swanganz and IABP  Tubes:  ET Tube; OGT    TELEMETRY: SR    EKG:   < from: 12 Lead ECG (18 @ 06:55) >  Systolic Blood Pressure 110 mmHG    Diastolic Blood Pressure 67 mmHG    Ventricular Rate 61 BPM    Atrial Rate 61 BPM    P-R Interval 158 ms    QRS Duration 88 ms     ms    QTc 521 ms    P Axis 51 degrees    R Axis 61 degrees    T Axis 80 degrees    Diagnosis Line NORMAL SINUS RHYTHM  LEFT ATRIAL ENLARGEMENT  PROLONGED QT  ABNORMAL ECG      IMAGING:  < from: Transthoracic Echocardiogram (18 @ 04:25) >  Conclusions:  1. Mild left atrial enlargement.  2. Concentric left ventricular hypertrophy.  3. Left ventricular ejection fraction, by visual  estimation, is 20-25%.  Severe global hypokinesia.  4. Normal right atrium.  5. Normal right ventricular size and function.  6. No significantvalvular abnormalities.  7. Normal pericardium with no pericardial effusion.    < from: Xray Chest 1 View- PORTABLE-Routine (18 @ 09:06) >  Indication: Intubated.    Impression:    The heart is enlarged. Left lowerlobe pneumonia and/or atelectasis. The   right lung is clear. Endotracheal tube is in good position. A central   line seen on the right and tip is superior vena cava. No pneumothorax.      Labs:  ABG - ( 2018 04:51 )  pH, Arterial: 7.40  pH, Blood: x     /  pCO2: 44    /  pO2: 123   / HCO3: 27    / Base Excess: 2.6   /  SaO2: 98                              13.0   14.2  )-----------( 287      ( 2018 04:56 )             39.7         140  |  103  |  22  ----------------------------<  120<H>  4.4   |  24  |  1.13    Ca    8.1<L>      2018 04:56  Phos  3.9       Mg     2.1         TPro  6.4  /  Alb  3.3  /  TBili  0.2  /  DBili  x   /  AST  81<H>  /  ALT  63<H>  /  AlkPhos  73  24    LIVER FUNCTIONS - ( 2018 04:56 )  Alb: 3.3 g/dL / Pro: 6.4 g/dL / ALK PHOS: 73 U/L / ALT: 63 U/L / AST: 81 U/L / GGT: x           PT/INR - ( 2018 04:56 )   PT: 11.7 sec;   INR: 1.08 ratio         PTT - ( 2018 04:56 )  PTT:53.2 sec      HEALTH ISSUES - PROBLEM Dx:

## 2018-06-24 NOTE — CHART NOTE - NSCHARTNOTEFT_GEN_A_CORE
====================  CCU MIDNIGHT ROUNDS  ====================    EFREN MEMBRENO  [id number]  Patient is a 47y old  Male who presents with a chief complaint of cardiac arrest (22 Jun 2018 03:47)      ====================  SUMMARY:  ====================      ====================  NEW EVENTS:  ====================    MEDICATIONS  (STANDING):  aspirin  chewable 81 milliGRAM(s) Oral daily  atorvastatin 80 milliGRAM(s) Oral at bedtime  chlorhexidine 0.12% Liquid 15 milliLiter(s) Swish and Spit two times a day  dexmedetomidine Infusion 0.078 MICROgram(s)/kG/Hr (2 mL/Hr) IV Continuous <Continuous>  erythromycin   Ointment 1 Application(s) Both EYES every 4 hours  heparin  Infusion.  Unit(s)/Hr (10 mL/Hr) IV Continuous <Continuous>  hydrALAZINE 50 milliGRAM(s) Oral every 8 hours  isosorbide   dinitrate Tablet (ISORDIL) 20 milliGRAM(s) Oral every 8 hours  midazolam Infusion 0.02 mG/kG/Hr (2.054 mL/Hr) IV Continuous <Continuous>  petrolatum Ophthalmic Ointment 1 Application(s) Both EYES every 6 hours  propofol Infusion 35 MICROgram(s)/kG/Min (21.567 mL/Hr) IV Continuous <Continuous>  ticagrelor 90 milliGRAM(s) Oral two times a day    MEDICATIONS  (PRN):  artificial  tears Solution 1 Drop(s) Both EYES every 4 hours PRN Dry Eyes  heparin  Injectable 4000 Unit(s) IV Push every 6 hours PRN For aPTT less than 40      ====================  VITALS (Last 12 hrs):  ====================    T(C): 37.5 (06-24-18 @ 20:00), Max: 37.7 (06-24-18 @ 08:45)  T(F): 99.5 (06-24-18 @ 20:00), Max: 99.9 (06-24-18 @ 08:45)  HR: 104 (06-24-18 @ 20:00) (94 - 120)  BP: --  BP(mean): --  ABP: 166/88 (06-24-18 @ 20:00) (140/80 - 194/102)  ABP(mean): 110 (06-24-18 @ 20:00) (98 - 130)  RR: 26 (06-24-18 @ 20:00) (26 - 42)  SpO2: 97% (06-24-18 @ 20:00) (93% - 100%)  Wt(kg): --  CVP(mm Hg): 4 (06-24-18 @ 20:00) (1 - 12)  CVP(cm H2O): --  CO: --  CI: --  PA: --  PA(mean): --  PCWP: --  SVR: --  PVR: --    I&O's Summary    23 Jun 2018 07:01  -  24 Jun 2018 07:00  --------------------------------------------------------  IN: 1520.5 mL / OUT: 1160 mL / NET: 360.5 mL    24 Jun 2018 07:01  -  24 Jun 2018 20:15  --------------------------------------------------------  IN: 951.2 mL / OUT: 825 mL / NET: 126.2 mL        Mode: AC/ CMV (Assist Control/ Continuous Mandatory Ventilation)  RR (machine): 24  TV (machine): 500  FiO2: 35  PEEP: 5  ITime: 1  MAP: 13  PIP: 23      ====================  NEW LABS:  ====================      06-24    140  |  102  |  22  ----------------------------<  154<H>  4.0   |  24  |  1.00    Ca    8.6      24 Jun 2018 11:58  Phos  2.8     06-24  Mg     2.1     06-24    TPro  7.3  /  Alb  3.8  /  TBili  0.2  /  DBili  x   /  AST  79<H>  /  ALT  61<H>  /  AlkPhos  84  06-24    PT/INR - ( 24 Jun 2018 04:56 )   PT: 11.7 sec;   INR: 1.08 ratio         PTT - ( 24 Jun 2018 11:58 )  PTT:55.5 sec      ABG - ( 24 Jun 2018 11:55 )  pH, Arterial: 7.45  pH, Blood: x     /  pCO2: 36    /  pO2: 107   / HCO3: 25    / Base Excess: 1.8   /  SaO2: 98                  ====================  PLAN:  ====================  -     GARRETT Grant NP  Beeper #[id number]  Spectra # [phone] ====================  CCU MIDNIGHT ROUNDS  ====================    EFREN MEMBRENO  [id number]  Patient is a 47y old  Male who presents with a chief complaint of cardiac arrest (22 Jun 2018 03:47)      ====================  SUMMARY:  ====================      ====================  NEW EVENTS:    CTH showing anoxic brain injury. Neuro was consulted and pt with guarded prognosis for recovery. Final EEG results pending.   ====================    MEDICATIONS  (STANDING):  aspirin  chewable 81 milliGRAM(s) Oral daily  atorvastatin 80 milliGRAM(s) Oral at bedtime  chlorhexidine 0.12% Liquid 15 milliLiter(s) Swish and Spit two times a day  dexmedetomidine Infusion 0.078 MICROgram(s)/kG/Hr (2 mL/Hr) IV Continuous <Continuous>  erythromycin   Ointment 1 Application(s) Both EYES every 4 hours  heparin  Infusion.  Unit(s)/Hr (10 mL/Hr) IV Continuous <Continuous>  hydrALAZINE 50 milliGRAM(s) Oral every 8 hours  isosorbide   dinitrate Tablet (ISORDIL) 20 milliGRAM(s) Oral every 8 hours  midazolam Infusion 0.02 mG/kG/Hr (2.054 mL/Hr) IV Continuous <Continuous>  petrolatum Ophthalmic Ointment 1 Application(s) Both EYES every 6 hours  propofol Infusion 35 MICROgram(s)/kG/Min (21.567 mL/Hr) IV Continuous <Continuous>  ticagrelor 90 milliGRAM(s) Oral two times a day    MEDICATIONS  (PRN):  artificial  tears Solution 1 Drop(s) Both EYES every 4 hours PRN Dry Eyes  heparin  Injectable 4000 Unit(s) IV Push every 6 hours PRN For aPTT less than 40      ====================  VITALS (Last 12 hrs):  ====================    T(C): 37.5 (06-24-18 @ 20:00), Max: 37.7 (06-24-18 @ 08:45)  T(F): 99.5 (06-24-18 @ 20:00), Max: 99.9 (06-24-18 @ 08:45)  HR: 104 (06-24-18 @ 20:00) (94 - 120)  ABP: 166/88 (06-24-18 @ 20:00) (140/80 - 194/102)  ABP(mean): 110 (06-24-18 @ 20:00) (98 - 130)  RR: 26 (06-24-18 @ 20:00) (26 - 42)  SpO2: 97% (06-24-18 @ 20:00) (93% - 100%)  CVP(mm Hg): 4 (06-24-18 @ 20:00) (1 - 12)        I&O's Summary    23 Jun 2018 07:01 - 24 Jun 2018 07:00  --------------------------------------------------------  IN: 1520.5 mL / OUT: 1160 mL / NET: 360.5 mL    24 Jun 2018 07:01  -  24 Jun 2018 20:15  --------------------------------------------------------  IN: 951.2 mL / OUT: 825 mL / NET: 126.2 mL      Mode: AC/ CMV (Assist Control/ Continuous Mandatory Ventilation)  RR (machine): 24  TV (machine): 500  FiO2: 35  PEEP: 5  ITime: 1  MAP: 13  PIP: 23      ====================  NEW LABS:  ====================      06-24    140  |  102  |  22  ----------------------------<  154<H>  4.0   |  24  |  1.00    Ca    8.6      24 Jun 2018 11:58  Phos  2.8     06-24  Mg     2.1     06-24    TPro  7.3  /  Alb  3.8  /  TBili  0.2  /  DBili  x   /  AST  79<H>  /  ALT  61<H>  /  AlkPhos  84  06-24    PT/INR - ( 24 Jun 2018 04:56 )   PT: 11.7 sec;   INR: 1.08 ratio         PTT - ( 24 Jun 2018 11:58 )  PTT:55.5 sec      ABG - ( 24 Jun 2018 11:55 )  pH, Arterial: 7.45  pH, Blood: x     /  pCO2: 36    /  pO2: 107   / HCO3: 25    / Base Excess: 1.8   /  SaO2: 98                  ====================  PLAN:  ====================  -Cardiac arrest: s/p hypothermia protocol. CTH showing anoxic brain injury. F/u Neuro consult and consider palliative consult. F/u offical EEG report. Assess mental status off sedation. GOC discussions.  -ACS: On heparin gtt, DAPT, high intensity statin. TTE with EF 20%. Hydral/isordil for BP control. Monitor lytes.  -Resp failure: intubated and sedated. Evidence of pulm edema and was previously being diuresed with lasix. Holding diuretics 2/2 low CVPs. Monitor strict I/Os. Trend ABGs.        Evelin Weldon, CCU NP  Beeper #[id number]  Spectra # [phone]

## 2018-06-24 NOTE — CONSULT NOTE ADULT - SUBJECTIVE AND OBJECTIVE BOX
Neurology Consult Note    Name  EFREN MEMBRENO    HPI:  48 y/o PMH of Asthma, HTN, and hx of substance abuse including cocaine, alcohol, marijuana coming in after cardiac arrest. Per EMS patient had been released from alf back in March and has not been compliant with  HTN meds. Patient was chocking ? vs. having SOB, he called EMS. When EMS arrived he collapsed and went into cardiac arrest (around 10:30pm), was intubated and ROSC was achieved by EMS after Epi x2 and bicarb x1  and he was transported to [facility]. BPs were found to be 200/120's, labetalol was given, BP continued to be elevated to 222/126  and he was placed on propofol drip, but that was discontinued, then was placed on nitroglycerin drip and bicarb drip. Patient was on a cooling protocol. Initial labs included lactate of 18, PCO2 of 145 and pH of <7. Patient also had episodes of desaturation on ventilation to 80s. Vent settings were PEEP 7, TV-500, RR-14, FiO2-100. Patient also with EKG changes with ST depression in lateral leads. At [facility] patient received , Brillinta 180mg, Heparin IVP 5000u, Lasix 80mg IVP. CTH was negative for acute bleed.   Patient was removed off propofol but patient had fixed gaze with no purposeful movement.  Patient was transferred to [facility] for further management of cardiac arrest. (22 Jun 2018 03:47)  Neurology consulted for evaluation of anoxic brain injury.    Subjective:    Review of Systems:  Constitutional:        Eyes, Ears, Mouth, Throat:   Respiratory:                            Cardiovascular:   Gastrointestinal:                                     Genitourinary:   Musculoskeletal:                                    Dermatologic:   Neurological: as per above                                                                 Psychiatric:   Endocrine:              Hematologic/Lymphatic:     MEDICATIONS  (STANDING):  aspirin  chewable 81 milliGRAM(s) Oral daily  atorvastatin 80 milliGRAM(s) Oral at bedtime  chlorhexidine 0.12% Liquid 15 milliLiter(s) Swish and Spit two times a day  dexmedetomidine Infusion 0.078 MICROgram(s)/kG/Hr (2 mL/Hr) IV Continuous <Continuous>  erythromycin   Ointment 1 Application(s) Both EYES every 4 hours  heparin  Infusion.  Unit(s)/Hr (10 mL/Hr) IV Continuous <Continuous>  hydrALAZINE 50 milliGRAM(s) Oral every 8 hours  isosorbide   dinitrate Tablet (ISORDIL) 20 milliGRAM(s) Oral every 8 hours  midazolam Infusion 0.02 mG/kG/Hr (2.054 mL/Hr) IV Continuous <Continuous>  petrolatum Ophthalmic Ointment 1 Application(s) Both EYES every 6 hours  propofol Infusion 35 MICROgram(s)/kG/Min (21.567 mL/Hr) IV Continuous <Continuous>  ticagrelor 90 milliGRAM(s) Oral two times a day    MEDICATIONS  (PRN):  artificial  tears Solution 1 Drop(s) Both EYES every 4 hours PRN Dry Eyes  heparin  Injectable 4000 Unit(s) IV Push every 6 hours PRN For aPTT less than 40      Allergies    Allergy Status Unknown    Intolerances      PAST MEDICAL & SURGICAL HISTORY:  Hypertension  Asthma  No pertinent past medical history  No significant past surgical history  Medical history unknown  No significant past surgical history    FH:  SH:    Objective:   Vital Signs Last 24 Hrs  T(C): 37.3 (24 Jun 2018 18:00), Max: 37.7 (24 Jun 2018 08:45)  T(F): 99.1 (24 Jun 2018 18:00), Max: 99.9 (24 Jun 2018 08:45)  HR: 106 (24 Jun 2018 19:00) (80 - 120)  BP: --  BP(mean): --  RR: 34 (24 Jun 2018 19:00) (23 - 42)  SpO2: 97% (24 Jun 2018 19:00) (93% - 100%)    General Exam:   General appearance: No acute distress                 General Adult Exam  GENERAL APPEARANCE: Well developed, well nourished, alert and cooperative, and appears to be in no acute distress.  EYES: PERRL, EOMI. Fundi normal, vision is grossly intact.  EARS: External auditory canals and tympanic membranes clear, hearing grossly intact.  NOSE: No nasal discharge.  CARDIAC: Normal S1 and S2. No S3, S4 or murmurs. Rhythm is regular. There is no peripheral edema, cyanosis or pallor. Extremities are warm and well perfused. Capillary refill is less than 2 seconds. No carotid bruits.  LUNGS: Clear to auscultation and percussion without rales, rhonchi, wheezing or diminished breath sounds.  ABDOMEN: Positive bowel sounds. Soft, nondistended, nontender. No guarding or rebound. No masses.  MUSKULOSKELETAL: Adequately aligned spine. ROM intact spine and extremities. No joint erythema or tenderness. Normal muscular development. Normal gait.  BACK: Examination of the spine reveals normal gait and posture, no spinal deformity, symmetry of spinal muscles, without tenderness, decreased range of motion or muscular spasm.  EXTREMITIES: No significant deformity or joint abnormality. No edema. Peripheral pulses intact. No varicosities.  SKIN: Skin normal color, texture and turgor with no lesions or eruptions.    Neurological Exam:  Mental Status: Orientated to self, date and place.  Attention intact.  No dysarthria, aphasia or neglect.  Knowledge intact.  Registration intact.  Short and long term memory grossly intact.      Cranial Nerves: CN I - not tested.  PERRL, EOMI, VFF, no nystagmus or diplopia.  No APD.  Fundi not visualized bilaterally.  CN V1-3 intact to light touch and pinprick.  No facial asymmetry.  Hearing intact to finger rub bilaterally.  Tongue, uvula and palate midline.  Sternocleidomastoid and Trapezius intact bilaterally.    Motor:   Tone: normal.                  Strength:     [] Upper extremity                      Delt       Bicep    Tricep                                                  R         5/5        5/5        5/5       5/5                                               L          5/5        5/5        5/5       5/5  [] Lower extremity                       HF          KE          KF        DF         PF                                               R        5/5        5/5        5/5       5/5       5/5                                               L         5/5        5/5       5/5       5/5        5/5  Pronator drift: none                 Dysmetria: None to finger-nose-finger or heel-shin-heel  No truncal ataxia.    Tremor: No resting, postural or action tremor.  No myoclonus.    Sensation: intact to light touch, pinprick, vibration and proprioception    Deep Tendon Reflexes: 1+ bilateral biceps, triceps, brachioradialis, knee and ankle  Toes flexor bilaterally    Gait: normal and stable.        Other:                        13.6   15.0  )-----------( 296      ( 24 Jun 2018 11:58 )             41.1     06-24    140  |  102  |  22  ----------------------------<  154<H>  4.0   |  24  |  1.00    Ca    8.6      24 Jun 2018 11:58  Phos  2.8     06-24  Mg     2.1     06-24    TPro  7.3  /  Alb  3.8  /  TBili  0.2  /  DBili  x   /  AST  79<H>  /  ALT  61<H>  /  AlkPhos  84  06-24    LIVER FUNCTIONS - ( 24 Jun 2018 11:58 )  Alb: 3.8 g/dL / Pro: 7.3 g/dL / ALK PHOS: 84 U/L / ALT: 61 U/L / AST: 79 U/L / GGT: x           PT/INR - ( 24 Jun 2018 04:56 )   PT: 11.7 sec;   INR: 1.08 ratio         PTT - ( 24 Jun 2018 11:58 )  PTT:55.5 sec    Radiology    EKG:  tele:  TTE:  EEG: Neurology Consult Note    Name  EFREN MEMBRENO    HPI:  48 y/o PMH of Asthma, HTN, and hx of substance abuse including cocaine, alcohol, marijuana coming in after cardiac arrest. Per EMS patient had been released from group home back in March and has not been compliant with  HTN meds. Patient was chocking ? vs. having SOB, he called EMS. When EMS arrived he collapsed and went into cardiac arrest (around 10:30pm), was intubated and ROSC was achieved by EMS after Epi x2 and bicarb x1  and he was transported to [facility]. BPs were found to be 200/120's, labetalol was given, BP continued to be elevated to 222/126  and he was placed on propofol drip, but that was discontinued, then was placed on nitroglycerin drip and bicarb drip. Patient was on a cooling protocol. Initial labs included lactate of 18, PCO2 of 145 and pH of <7. Patient also had episodes of desaturation on ventilation to 80s. Vent settings were PEEP 7, TV-500, RR-14, FiO2-100. Patient also with EKG changes with ST depression in lateral leads. At [facility] patient received , Brillinta 180mg, Heparin IVP 5000u, Lasix 80mg IVP. CTH was negative for acute bleed.   Patient was removed off propofol but patient had fixed gaze with no purposeful movement.  Patient was transferred to [facility] for further management of cardiac arrest. (22 Jun 2018 03:47)  Neurology consulted for evaluation of anoxic brain injury.    Subjective:    Review of Systems:  Constitutional:        Eyes, Ears, Mouth, Throat:   Respiratory:                            Cardiovascular:   Gastrointestinal:                                     Genitourinary:   Musculoskeletal:                                    Dermatologic:   Neurological: as per above                                                                 Psychiatric:   Endocrine:              Hematologic/Lymphatic:     MEDICATIONS  (STANDING):  aspirin  chewable 81 milliGRAM(s) Oral daily  atorvastatin 80 milliGRAM(s) Oral at bedtime  chlorhexidine 0.12% Liquid 15 milliLiter(s) Swish and Spit two times a day  dexmedetomidine Infusion 0.078 MICROgram(s)/kG/Hr (2 mL/Hr) IV Continuous <Continuous>  erythromycin   Ointment 1 Application(s) Both EYES every 4 hours  heparin  Infusion.  Unit(s)/Hr (10 mL/Hr) IV Continuous <Continuous>  hydrALAZINE 50 milliGRAM(s) Oral every 8 hours  isosorbide   dinitrate Tablet (ISORDIL) 20 milliGRAM(s) Oral every 8 hours  midazolam Infusion 0.02 mG/kG/Hr (2.054 mL/Hr) IV Continuous <Continuous>  petrolatum Ophthalmic Ointment 1 Application(s) Both EYES every 6 hours  propofol Infusion 35 MICROgram(s)/kG/Min (21.567 mL/Hr) IV Continuous <Continuous>  ticagrelor 90 milliGRAM(s) Oral two times a day    MEDICATIONS  (PRN):  artificial  tears Solution 1 Drop(s) Both EYES every 4 hours PRN Dry Eyes  heparin  Injectable 4000 Unit(s) IV Push every 6 hours PRN For aPTT less than 40      Allergies    Allergy Status Unknown    Intolerances      PAST MEDICAL & SURGICAL HISTORY:  Hypertension  Asthma  No pertinent past medical history  No significant past surgical history  Medical history unknown  No significant past surgical history    FH:  SH:    Objective:   Vital Signs Last 24 Hrs  T(C): 37.3 (24 Jun 2018 18:00), Max: 37.7 (24 Jun 2018 08:45)  T(F): 99.1 (24 Jun 2018 18:00), Max: 99.9 (24 Jun 2018 08:45)  HR: 106 (24 Jun 2018 19:00) (80 - 120)  BP: --  BP(mean): --  RR: 34 (24 Jun 2018 19:00) (23 - 42)  SpO2: 97% (24 Jun 2018 19:00) (93% - 100%)    General Exam:   General appearance:  intubated, sedated               Neurological Exam:  Mental Status:  eyes closed, does not open to verbal, tactile, or noxious stimuli.      Cranial Nerves:   PERRL, no corneal reflex, no oculocephalic reflex, no gag/cough reflex    Motor:   Tone: flaccid       Strength:     No withdraw to noxious stimuli    Sensation: No withdraw to noxious stimuli    Deep Tendon Reflexes: 0 bilateral biceps, triceps, brachioradialis, knee and ankle        Other:                        13.6   15.0  )-----------( 296      ( 24 Jun 2018 11:58 )             41.1     06-24    140  |  102  |  22  ----------------------------<  154<H>  4.0   |  24  |  1.00    Ca    8.6      24 Jun 2018 11:58  Phos  2.8     06-24  Mg     2.1     06-24    TPro  7.3  /  Alb  3.8  /  TBili  0.2  /  DBili  x   /  AST  79<H>  /  ALT  61<H>  /  AlkPhos  84  06-24    LIVER FUNCTIONS - ( 24 Jun 2018 11:58 )  Alb: 3.8 g/dL / Pro: 7.3 g/dL / ALK PHOS: 84 U/L / ALT: 61 U/L / AST: 79 U/L / GGT: x           PT/INR - ( 24 Jun 2018 04:56 )   PT: 11.7 sec;   INR: 1.08 ratio         PTT - ( 24 Jun 2018 11:58 )  PTT:55.5 sec    Radiology

## 2018-06-25 NOTE — CONSULT NOTE ADULT - PROBLEM SELECTOR RECOMMENDATION 3
Attempted to call contact in chart (Pura Hdz), left voicemail. Reportedly has a significant other who may be his wife, but will need to obtain further collateral to determine appropriate decision maker. Will attempt to contact tomorrow as well. Discussed with CCU team.

## 2018-06-25 NOTE — EEG REPORT - NS EEG TEXT BOX
[facility]  Report of Routine EEG with Video    [facility]: [address], [address] [place] [address], Phone: [phone]  [facility]: [address] [place] [address], Phone: [phone]  Office: [address] [facility], [address], [place] [address], Phone: [phone]    Patient Name: West Hdz     Age: 47 year  : 1970  Patient ID: -, MRN #: MR#  [id number], Location: CCU  BED  7  Referring Physician: -    EEG #: [id number]  Study Date: 2018		    Technical Information:					  On Instrument: [id number]  Placement and Labeling of Electrodes:  The EEG was performed utilizing 20 channels referential EEG connections (coronal over temporal over parasagittal montage) using all standard 10-20 electrode placements with EKG.  Recording was at a sampling rate of 256 samples per second per channel.  Time synchronized digital video recording was done simultaneously with EEG recording.  A low light infrared camera was used for low light recording.  Last and seizure detection algorithms were utilized.    History:  h/o asthma, htn, substance abuse, ETOH abuse    p/w  s/p  cardiac arrest    Medication	  No Data.	    Study Interpretation:    FINDINGS:  The background was attenuated, with a burst suppression pattern.  Burst duration 1-5 seconds, interburst interval 1-10 seconds    Generalized Slowing:  -Burst Suppression, Generalized    Focal Slowing:   None was present.    Sleep Background:  Stage II sleep transients were not recorded.    Other Non-Epileptiform Findings:  None were present.    Interictal Epileptiform Activity:   None were present.    Events:  Clinical events: None recorded.  Seizures: None recorded.    Activation Procedures:   Hyperventilation was not performed.    Photic stimulation was not performed.    Artifacts:  Intermittent myogenic and movement artifacts were noted.    ECG:  The heart rate on single channel ECG was predominantly between 60-80 BPM.    EEG Summary/Classification:  Abnormal EEG study in the unresponsive states.  -Burst Suppression, Generalized    EEG Impression/Clinical Correlate:    Abnormal EEG.  This EEG provides evidence of a severe diffuse encephalopathy, with a generalized burst suppression pattern seen throughout the recording.  No epileptiform abnormalities were recorded.    Mike Paiz MD  Attending Physician, [facility]
Patient Name: West Hdz     Age: 47 year  : 1970  Patient ID: -, MRN #: MR#  [id number], Location: CCU  BED  7  Referring Physician: -    EEG #: [id number]  Study Date: 2018 (4 hour 6 minute study)		    Technical Information:					  On Instrument: [id number]  Placement and Labeling of Electrodes:  The EEG was performed utilizing 20 channels referential EEG connections (coronal over temporal over parasagittal montage) using all standard 10-20 electrode placements with EKG.  Recording was at a sampling rate of 256 samples per second per channel.  Time synchronized digital video recording was done simultaneously with EEG recording.  A low light infrared camera was used for low light recording.  Last and seizure detection algorithms were utilized.    History:  h/o asthma, htn, substance abuse, ETOH abuse    p/w  s/p  cardiac arrest    Medication	  No Data.	    Study Interpretation:    FINDINGS:  The background was attenuated, with a burst suppression pattern.  Burst duration 1-2 seconds, interburst interval 1-5 seconds    Generalized Slowing:  -Burst Suppression, Generalized    Focal Slowing:   None was present.    Sleep Background:  Stage II sleep transients were not recorded.    Other Non-Epileptiform Findings:  None were present.    Interictal Epileptiform Activity:   None were present.    Events:  Clinical events: None recorded.  Seizures: None recorded.    Activation Procedures:   Hyperventilation was not performed.    Photic stimulation was not performed.    Artifacts:  Intermittent myogenic and movement artifacts were noted.    ECG:  The heart rate on single channel ECG was predominantly between 60-80 BPM.    EEG Summary/Classification:  Abnormal EEG study in the unresponsive states.  -Burst Suppression, Generalized    EEG Impression/Clinical Correlate:    Abnormal EEG.  This EEG provides evidence of a severe diffuse encephalopathy, with a generalized burst suppression pattern seen throughout the recording.  No epileptiform abnormalities were recorded.    Mike Paiz MD  Attending Physician, [facility] [place]
Study Date: 6/24-6/25/2018 (machine connected but recording EEG only until 16:40 6/24/18)    History:  h/o asthma, htn, substance abuse, ETOH abuse  p/w  s/p  cardiac arrest    Study Interpretation:    FINDINGS:  The background was attenuated, with a burst suppression pattern.  Burst duration 1-2 seconds, interburst interval 1-5 seconds, with irregular bursts of theta/delta.    Focal Slowing: None was present.  Prominent frontalis muscle artifact.      After 12:08 there was prominent respiratory artifacts, increased rate of respirations noted for 10 min.  After approx 1245 there was increased facial myogenic artifact obscuring most of the recording.  Similar background activity with attenuation and intermittent bursts of irregular theta/delta noted.    Sleep Background:  Stage II sleep transients were not recorded.    Interictal Epileptiform Activity:   None were present.    Events:  Clinical events: None recorded.  Seizures: None recorded.    Activation Procedures:   Hyperventilation was not performed.    Photic stimulation was not performed.    Artifacts:  Intermittent myogenic and movement artifacts were noted.  Electrodes displaced at 16:40 per staff    ECG:  The heart rate on single channel ECG was predominantly between 60-80 BPM.    EEG Summary/Classification:  Abnormal EEG study in the unresponsive states.  -Burst Suppression, Generalized    EEG Impression/Clinical Correlate:    Findings indicate severe diffuse encephalopathy.  No epileptiform abnormalities were recorded.  Electrodes displaced at 16:40 per staff    Jon Hurtado MD  Attending Physician, [facility] [place]

## 2018-06-25 NOTE — PROVIDER CONTACT NOTE (OTHER) - ACTION/TREATMENT ORDERED:
increase versed / propofol as needed for overbreathing vent and seizure like activity
MONE Quach NP at bedside, propofol increased 75mcg/kg/min, pt lavaged by MONE Quach NP, ETT patent, ambu bag 100% and ventilator returned to full vent support, respiration rate decreasing 35/min on vent
cooling blanket applied under patient

## 2018-06-25 NOTE — PROGRESS NOTE ADULT - ASSESSMENT
46 y/o PMH of Asthma, HTN, and hx of substance abuse including cocaine, alcohol, marijuana coming in after witnessed cardiac arrest by EMS after chocking? SOB? transferred from [facility].     CV:  1) Cardiac arrest   - was on hypothermia protocol on [facility], arrest was around 10:30pm   -will be placed on hypothermia protocol here  -will get central line     2) ACS, ST depressions on lateral leads   -s/p ASA, Brilinta and Heparin bolus at [facility]  -cont on ASA, Brilinta and heparin drip  -high intensity statin   -cath when clinical condition improves  -Echo ordered  -bedside echo showed severe LV systolic dysfunction  -Trend Cardiac enzymes, high sensitivity trop 353 with CK elevation       3) Hypertension  -SBP in the 180's on admission   -continue monitoring, off nitro drip      Resp:  -pt with hx of asthma   -intubated and sedation vacation  -patient with evidence of pulmonary edema  -continue with diuresis with lasix 80 BID  -Trend I/Os, monitor ABGs    Renal:  -LALITA likely prerenal after cardiac arrest  -trend Scr and I/Os    Neuro:  -sedation vacation  -will need further neuro checks   -sedation vacation    -CTH at [facility] was negative for acute pathology  -will assess mentation after clinical condition improves   -6/23 EEG provides evidence of a severe diffuse encephalopathy, with a generalized burst suppression pattern seen throughout the recording.  No epileptiform abnormalities were recorded      Endo:  -TSH, HgA1C, lipid panel ordered     GI:   Hypoactive BS improving 6/23 started Jevity TF with goal rate of 60    ID:  - No active issues    Heme:  -leukocytosis mild  -Hg stable     DVT ppx:  - heparin ggt    Ethics:   - [facility] 46 y/o PMH of Asthma, HTN, and hx of substance abuse including cocaine, alcohol, marijuana coming in after witnessed cardiac arrest by EMS now w/ confirmed anoxic brain injury    CV:  1) Cardiac arrest   - no longer on hypothermia  - c/w central line    2) ACS, ST depressions on lateral leads - -bedside echo showed severe LV systolic dysfunction  -cont on ASA, Brilinta and heparin drip; will need to have GOC discussion  - c/w Atorvastatin 80  - will likely hold off on cath given hx of anoxic brain injury  - f/u TTE  -Trend Cardiac enzymes, high sensitivity trop 353 with CK elevation     3) Hypertension - issue overnight, received additional hydralazine  -SBP in the 180's on admission   -continue monitoring,  off nitro drip      Respiratory - hx of asthma  -intubated w/ sedation vacation  -patient with evidence of pulmonary edema  -continue with diuresis with lasix 80 BID  -Trend I/Os, monitor ABGs, daily weights    Renal:  -LALITA likely prerenal after cardiac arrest  -trend Scr on BMP and monitor I/Os    Neuro: -6/23 EEG provides evidence of a severe diffuse encephalopathy, with a generalized burst suppression pattern seen throughout the recording.  No epileptiform abnormalities were recorded  -sedation vacation   -will assess mentation after clinical condition improves   -6/23 EEG provides evidence of a severe diffuse encephalopathy, with a generalized burst suppression pattern seen throughout the recording.  No epileptiform abnormalities were recorded    Endo: lipid profile shows Cholesterol 134, , HDL 45  - f/u A1c  - f/u TSH    GI:   - no active issues  - Hypoactive BS improving 6/23 started Jevity TF with goal rate of 60    ID:  - No active issues    Heme: mild leukocytosis; Hgb stable  - no active issues  - will trend CBC    DVT ppx:  - heparin ggt    Ethics:   - [facility]

## 2018-06-25 NOTE — PROGRESS NOTE ADULT - SUBJECTIVE AND OBJECTIVE BOX
CONTACT INFO:  Bill White MD  Internal Medicine PGY1  Pager:  [phone]      HPI / INTERVAL HISTORY:        OBJECTIVE:  VITAL SIGNS:  ICU Vital Signs Last 24 Hrs  T(C): 2.5 (25 Jun 2018 06:00), Max: 37.7 (24 Jun 2018 08:45)  T(F): 36.5 (25 Jun 2018 06:00), Max: 99.9 (24 Jun 2018 08:45)  HR: 94 (25 Jun 2018 06:30) (84 - 120)  BP: --  BP(mean): --  ABP: 142/76 (25 Jun 2018 06:30) (106/62 - 194/102)  ABP(mean): 94 (25 Jun 2018 06:30) (74 - 175)  RR: 21 (25 Jun 2018 06:30) (20 - 42)  SpO2: 98% (25 Jun 2018 06:30) (93% - 100%)    Mode: AC/ CMV (Assist Control/ Continuous Mandatory Ventilation), RR (machine): 24, TV (machine): 500, FiO2: 35, PEEP: 5, ITime: 1, MAP: 14, PIP: 27    06-24 @ 07:01  -  06-25 @ 07:00  --------------------------------------------------------  IN: 2310.9 mL / OUT: 1555 mL / NET: 755.9 mL      CAPILLARY BLOOD GLUCOSE      POCT Blood Glucose.: 114 mg/dL (24 Jun 2018 02:03)      PHYSICAL EXAM:  Gen: NAD  HEENT: NC/AT; PERRL, anicteric sclera  Neck: supple, no JVD  Resp: clear to ausculation B/L; no wheezes, rales or rhonchi  Cardiovasc: S1, S2 normal; RRR; no murmurs, rubs or gallops  GI: soft, nondistended, nontender; +BS  Extr: warm, well-perfused, PT/DP pulses 2+ B/L; no LE edema  Skin: normal color and turgor  Neuro:     LABS:                        13.1   14.9  )-----------( 289      ( 25 Jun 2018 05:26 )             40.3     06-25    139  |  102  |  24<H>  ----------------------------<  136<H>  3.9   |  25  |  0.95    Ca    9.5      25 Jun 2018 05:29  Phos  3.5     06-25  Mg     2.1     06-25    TPro  6.7  /  Alb  3.6  /  TBili  0.2  /  DBili  x   /  AST  77<H>  /  ALT  54<H>  /  AlkPhos  87  06-25    LIVER FUNCTIONS - ( 25 Jun 2018 05:29 )  Alb: 3.6 g/dL / Pro: 6.7 g/dL / ALK PHOS: 87 U/L / ALT: 54 U/L / AST: 77 U/L / GGT: x           PT/INR - ( 25 Jun 2018 05:26 )   PT: 10.4 sec;   INR: 0.96 ratio         PTT - ( 25 Jun 2018 05:26 )  PTT:58.1 sec    CARDIAC MARKERS ( 25 Jun 2018 05:29 )  x     / x     / 445 U/L / x     / 6.3 ng/mL      RADIOLOGY & ADDITIONAL TESTS:     MEDICATIONS:  artificial  tears Solution 1 Drop(s) Both EYES every 4 hours PRN  aspirin  chewable 81 milliGRAM(s) Oral daily  atorvastatin 80 milliGRAM(s) Oral at bedtime  chlorhexidine 0.12% Liquid 15 milliLiter(s) Swish and Spit two times a day  erythromycin   Ointment 1 Application(s) Both EYES every 4 hours  heparin  Infusion.  Unit(s)/Hr IV Continuous <Continuous>  heparin  Injectable 4000 Unit(s) IV Push every 6 hours PRN  hydrALAZINE 50 milliGRAM(s) Oral every 8 hours  isosorbide   dinitrate Tablet (ISORDIL) 20 milliGRAM(s) Oral every 8 hours  petrolatum Ophthalmic Ointment 1 Application(s) Both EYES every 6 hours  propofol Infusion 25 MICROgram(s)/kG/Min IV Continuous <Continuous>  ticagrelor 90 milliGRAM(s) Oral two times a day      ALLERGIES:  Allergy Status Unknown CONTACT INFO:  Bill White MD  Internal Medicine PGY1  Pager:  [phone]    HPI / INTERVAL HISTORY:  Only issue o/n is BP; got extra hydralazine and isordil    OBJECTIVE:  VITAL SIGNS:  ICU Vital Signs Last 24 Hrs  T(C): 2.5 (25 Jun 2018 06:00), Max: 37.7 (24 Jun 2018 08:45)  T(F): 36.5 (25 Jun 2018 06:00), Max: 99.9 (24 Jun 2018 08:45)  HR: 94 (25 Jun 2018 06:30) (84 - 120)  ABP: 142/76 (25 Jun 2018 06:30) (106/62 - 194/102)  ABP(mean): 94 (25 Jun 2018 06:30) (74 - 175)  RR: 21 (25 Jun 2018 06:30) (20 - 42)  SpO2: 98% (25 Jun 2018 06:30) (93% - 100%)    Mode: AC/ CMV (Assist Control/ Continuous Mandatory Ventilation), RR (machine): 24, TV (machine): 500, FiO2: 35, PEEP: 5, ITime: 1, MAP: 14, PIP: 27    06-24 @ 07:01  -  06-25 @ 07:00  --------------------------------------------------------  IN: 2310.9 mL / OUT: 1555 mL / NET: 755.9 mL    CAPILLARY BLOOD GLUCOSE    POCT Blood Glucose.: 114 mg/dL (24 Jun 2018 02:03)    PHYSICAL EXAM:  Gen: intubated/sedated  Neck: R IJ in place  Resp: clear to ausculation B/L from the anterior; no wheezes, rales or rhonchi; however, difficult to appreciate with vent and unable to auscultate posterior  Cardiovasc: S1, S2 normal; RRR; no murmurs, rubs or gallops  GI: soft, nondistended, nontender; +BS  Extr: no LE edema  Skin: normal color and turgor  Neuro:  intubated/sedated     LABS:                        13.1   14.9  )-----------( 289      ( 25 Jun 2018 05:26 )             40.3     06-25    139  |  102  |  24<H>  ----------------------------<  136<H>  3.9   |  25  |  0.95    Ca    9.5      25 Jun 2018 05:29  Phos  3.5     06-25  Mg     2.1     06-25    TPro  6.7  /  Alb  3.6  /  TBili  0.2  /  DBili  x   /  AST  77<H>  /  ALT  54<H>  /  AlkPhos  87  06-25    LIVER FUNCTIONS - ( 25 Jun 2018 05:29 )  Alb: 3.6 g/dL / Pro: 6.7 g/dL / ALK PHOS: 87 U/L / ALT: 54 U/L / AST: 77 U/L / GGT: x           PT/INR - ( 25 Jun 2018 05:26 )   PT: 10.4 sec;   INR: 0.96 ratio         PTT - ( 25 Jun 2018 05:26 )  PTT:58.1 sec    CARDIAC MARKERS ( 25 Jun 2018 05:29 )  x     / x     / 445 U/L / x     / 6.3 ng/mL    RADIOLOGY & ADDITIONAL TESTS:     MEDICATIONS:  artificial  tears Solution 1 Drop(s) Both EYES every 4 hours PRN  aspirin  chewable 81 milliGRAM(s) Oral daily  atorvastatin 80 milliGRAM(s) Oral at bedtime  chlorhexidine 0.12% Liquid 15 milliLiter(s) Swish and Spit two times a day  erythromycin   Ointment 1 Application(s) Both EYES every 4 hours  heparin  Infusion.  Unit(s)/Hr IV Continuous <Continuous>  heparin  Injectable 4000 Unit(s) IV Push every 6 hours PRN  hydrALAZINE 50 milliGRAM(s) Oral every 8 hours  isosorbide   dinitrate Tablet (ISORDIL) 20 milliGRAM(s) Oral every 8 hours  petrolatum Ophthalmic Ointment 1 Application(s) Both EYES every 6 hours  propofol Infusion 25 MICROgram(s)/kG/Min IV Continuous <Continuous>  ticagrelor 90 milliGRAM(s) Oral two times a day    ALLERGIES:  Allergy Status Unknown

## 2018-06-25 NOTE — PROVIDER CONTACT NOTE (OTHER) - REASON
pt febrile 38.6 C via urinary bladder
pt hypertensive SBP 200s, tachypnea respiration rate 50s/min on full vent support, pt using accessory muscles to breathe, high peak pressure alarm on ventilator
pt with possible seizure activity

## 2018-06-25 NOTE — PROVIDER CONTACT NOTE (OTHER) - SITUATION
Pt with lip and tongue movements, overbreathing vent
pt febrile 38.6 C via urinary bladder
pt hypertensive SBP 200s, tachypnea respiration rate 50s/min on full vent support, pt using accessory muscles to breathe, high peak pressure alarm on ventilator

## 2018-06-25 NOTE — PROVIDER CONTACT NOTE (OTHER) - BACKGROUND
pt rewarming on hypothermia s/p cardiac arrest
intubated, s/p cardiac arrest on sedation for tachypnea, s/p hypoxic inury
s/p cardiac arrest, s/p hypothermia protocol, hypoxic brain injury, intubated

## 2018-06-25 NOTE — CONSULT NOTE ADULT - SUBJECTIVE AND OBJECTIVE BOX
HPI: 47M with PMH of asthma, HTN, substance abuse (cocaine, EtOH, marijuana) here after cardiac arrest, with PEA, now with confirmed anoxic brain injury.     PERTINENT PMH REVIEWED:  [ X] YES [ ] NO           SOCIAL HISTORY:   Significant other/partner:  [X ] YES  [ ] NO               Children:  [X ] YES  [ ] NO                   Muslim/Spirituality: reportedly Mormon  Substance hx:  [X ] YES per EMS   [ ] NO                   Tobacco hx:  [ ] YES  [ ] NO                       Alcohol hx: [ ] YES  [ ] NO         Home Opioid hx:  [ ] YES  [ ] NO   Living Situation: [ ] Home  [ ] Long term care  [ ] Rehab [ ] Other  Unable to assess from patient    FAMILY HISTORY:  FAMILY HISTORY:  No pertinent family history in first degree relatives    [ ] Family history non-contributory     BASELINE (I)ADLs (prior to admission):  [place]: [ ] total  [ ] moderate [ ] dependent    ADVANCE DIRECTIVES:    DNR [ ] YES [X ] NO                            [ ] Completed  MOLST  [ ] YES [ ] NO                      [ ] Completed  Health Care Proxy [ ] YES  [ ] NO   [ ] Completed  Living Will  [ ] YES [ ] NO             [ ] Surrogate  [ ] HCP  [ ] Guardian:  unclear decision maker at this point                                                     ALLERGIES:   Allergies    Allergy Status Unknown    Intolerances        MEDICATIONS:   MEDICATIONS  (STANDING):  aspirin  chewable 81 milliGRAM(s) Oral daily  atorvastatin 80 milliGRAM(s) Oral at bedtime  chlorhexidine 0.12% Liquid 15 milliLiter(s) Swish and Spit two times a day  erythromycin   Ointment 1 Application(s) Both EYES every 4 hours  furosemide   Injectable 80 milliGRAM(s) IV Push every 8 hours  heparin  Injectable 5000 Unit(s) SubCutaneous every 8 hours  hydrALAZINE 50 milliGRAM(s) Oral every 8 hours  isosorbide   dinitrate Tablet (ISORDIL) 20 milliGRAM(s) Oral every 8 hours  pantoprazole  Injectable 40 milliGRAM(s) IV Push daily  petrolatum Ophthalmic Ointment 1 Application(s) Both EYES every 6 hours  propofol Infusion 25 MICROgram(s)/kG/Min (15.405 mL/Hr) IV Continuous <Continuous>  ticagrelor 90 milliGRAM(s) Oral two times a day    MEDICATIONS  (PRN):  artificial  tears Solution 1 Drop(s) Both EYES every 4 hours PRN Dry Eyes      PRESENT SYMPTOMS:  Source: [ ] Patient   [ ] Family   [ X] Team     Pain:                        [X ] No [ ] Yes             [ ] Mild [ ] Moderate [ ] Severe    Onset -  Location -  Duration -  Character -  Alleviating/Aggravating -  Radiation -  Timing -      Dyspnea:                [X ] No [ ] Yes             [ ] Mild [ ] Moderate [ ] Severe    Anxiety:                  [ ] No [ ] Yes             [ ] Mild [ ] Moderate [ ] Severe    Fatigue:                  [ ] No [X ] Yes             [ ] Mild [ ] Moderate [X ] Severe     Nausea:                  [ ] No [ ] Yes             [ ] Mild [ ] Moderate [ ] Severe    Loss of appetite:   [ ] No [ ] Yes             [ ] Mild [ ] Moderate [ ] Severe    Constipation:        [ ] No [ ] Yes             [ ] Mild [ ] Moderate [ ] Severe    Other Symptoms:  [ ] All other review of systems negative   [X ] Unable to obtain due to poor mentation     Karnofsky Performance Score/Palliative Performance Status Version 2:  <30%    PHYSICAL EXAM:  Vital Signs Last 24 Hrs  T(C): 38.6 (25 Jun 2018 16:00), Max: 38.6 (25 Jun 2018 16:00)  T(F): 101.5 (25 Jun 2018 16:00), Max: 101.5 (25 Jun 2018 16:00)  HR: 130 (25 Jun 2018 16:00) (84 - 130)  BP: --  BP(mean): --  RR: 31 (25 Jun 2018 16:00) (20 - 50)  SpO2: 99% (25 Jun 2018 16:00) (97% - 100%) I&O's Summary    24 Jun 2018 07:01  -  25 Jun 2018 07:00  --------------------------------------------------------  IN: 2310.9 mL / OUT: 1680 mL / NET: 630.9 mL    25 Jun 2018 07:01  -  25 Jun 2018 16:55  --------------------------------------------------------  IN: 1171.3 mL / OUT: 1270 mL / NET: -98.7 mL        General:  [ ] Alert  [ ] Oriented x      [ ] Lethargic  [ ] Agitated   [ ] Cachexia   [ ] Unarousable  [ ] Verbal  [X ] Non-Verbal  [ ] Sedated    HEENT:  [ ] Normal   [ ] Dry mouth   [ X] ET Tube    [ ] Trach  [ ] Oral lesions    Lungs:   [X ] Clear [ ] Tachypnea  [ ] Audible excessive secretions   [ ] Rhonchi        [ ] Right [ ] Left [ ] Bilateral  [ ] Crackles        [ ] Right [ ] Left [ ] Bilateral  [ ] Wheezing     [ ] Right [ ] Left [ ] Bilateral    Cardiovascular:  [X ] Regular [ ] Irregular [ ] Tachycardia   [ ] Bradycardia  [ ] Murmur [ ] Other    Abdomen: [ X] Soft  [ ] Distended   [X ] +BS  [X ] Non tender [ ] Tender  [ ]PEG   [ ]OGT/ NGT   [ ] Ostomy   Last BM:       Genitourinary: [ ] Normal [ ] Incontinent   [ ] Oliguria/Anuria   [X ] Sanchez   [ ] No sanchez    Musculoskeletal:  [ ] Normal   [ ] Weakness  [X ] Bedbound/Wheelchair bound [ ] Edema    Neurological: [ ] No focal deficits  [X ] Cognitive impairment  [ ] Dysphagia [ ] Dysarthria [ ] Paresis [ ] Other     Skin: [ ] Normal   [ ] Pressure ulcer(s)     [ ] Rash   [ ] Other    LABS: reviewed    06-25    139  |  102  |  24<H>  ----------------------------<  136<H>  3.9   |  25  |  0.95    Ca    9.5      25 Jun 2018 05:29  Phos  3.5     06-25  Mg     2.1     06-25    TPro  6.7  /  Alb  3.6  /  TBili  0.2  /  DBili  x   /  AST  77<H>  /  ALT  54<H>  /  AlkPhos  87  06-25    PT/INR - ( 25 Jun 2018 05:26 )   PT: 10.4 sec;   INR: 0.96 ratio         PTT - ( 25 Jun 2018 05:26 )  PTT:58.1 sec      Shock: [ ] Septic [ ] Cardiogenic [ ] Neurologic [ ] Hypovolemic  Vasopressors x 0  Inotrophs x 0    Protein Calorie Malnutrition: [ ] Mild [ ] Moderate [ ] Severe  Oral Intake: [ ] Unable/mouth care only [ ] Minimal [ ] Moderate [ ] Full Capability  Diet: [ ] NPO [ ] Tube feeds [ ] TPN [ ] Other     RADIOLOGY & ADDITIONAL STUDIES: CT head 6/24: diffuse anoxic brain injury.    REFERRALS:   [ ] Chaplaincy  [ ] Hospice  [ ] Child Life  [ ] Social Work  [ ] Case management [ ] Holistic Therapy

## 2018-06-25 NOTE — PROVIDER CONTACT NOTE (OTHER) - ASSESSMENT
pt hypertensive SBP 200s, tachypnea respiration rate 50s/min on full vent support, pt using accessory muscles to breathe, pulse ox 100%

## 2018-06-26 NOTE — PROGRESS NOTE ADULT - SUBJECTIVE AND OBJECTIVE BOX
CONTACT INFO:  Bill White MD  Internal Medicine PGY1  Pager:  [phone]      HPI / INTERVAL HISTORY:  pt febrile o/n to 101.2, not cultured    OBJECTIVE:  VITAL SIGNS:  ICU Vital Signs Last 24 Hrs  T(C): 36 (26 Jun 2018 06:00), Max: 38.6 (25 Jun 2018 16:00)  T(F): 96.8 (26 Jun 2018 06:00), Max: 101.5 (25 Jun 2018 16:00)  HR: 96 (26 Jun 2018 06:30) (88 - 130)  ABP: 92/60 (26 Jun 2018 06:30) (92/60 - 200/90)  ABP(mean): 70 (26 Jun 2018 06:30) (70 - 114)  RR: 23 (26 Jun 2018 06:30) (16 - 50)  SpO2: 65% (26 Jun 2018 06:30) (65% - 100%)    Mode: AC/ CMV (Assist Control/ Continuous Mandatory Ventilation), RR (machine): 24, TV (machine): 500, FiO2: 35, PEEP: 5, ITime: 1, MAP: 15, PIP: 30    06-25 @ 07:01  -  06-26 @ 07:00  --------------------------------------------------------  IN: 2817.1 mL / OUT: 4530 mL / NET: -1712.9 mL    CAPILLARY BLOOD GLUCOSE    PHYSICAL EXAM:  Gen: intubated/sedated  Resp: clear to ausculation B/L; no wheezes, rales or rhonchi but difficult with vent  Cardiovasc: S1, S2 normal; RRR; no murmurs, rubs or gallops  GI: soft, nondistended, nontender; +BS  Extr: warm, well-perfused, PT/DP pulses 2+ B/L; no LE edema  Skin: normal color and turgor  Neuro: intubated    LABS:                        12.6   16.5  )-----------( 299      ( 26 Jun 2018 05:30 )             38.3     06-26    142  |  101  |  28<H>  ----------------------------<  166<H>  3.7   |  27  |  1.16    Ca    9.2      26 Jun 2018 05:30  Phos  3.9     06-26  Mg     2.2     06-26    TPro  6.9  /  Alb  3.7  /  TBili  0.2  /  DBili  x   /  AST  64<H>  /  ALT  60<H>  /  AlkPhos  95  06-26    LIVER FUNCTIONS - ( 26 Jun 2018 05:30 )  Alb: 3.7 g/dL / Pro: 6.9 g/dL / ALK PHOS: 95 U/L / ALT: 60 U/L / AST: 64 U/L / GGT: x           PT/INR - ( 26 Jun 2018 05:30 )   PT: 10.9 sec;   INR: 1.01 ratio         PTT - ( 26 Jun 2018 05:30 )  PTT:28.8 sec    CARDIAC MARKERS ( 25 Jun 2018 05:29 )  x     / x     / 445 U/L / x     / 6.3 ng/mL    RADIOLOGY & ADDITIONAL TESTS:     MEDICATIONS:  artificial  tears Solution 1 Drop(s) Both EYES every 4 hours PRN  aspirin  chewable 81 milliGRAM(s) Oral daily  atorvastatin 80 milliGRAM(s) Oral at bedtime  chlorhexidine 0.12% Liquid 15 milliLiter(s) Swish and Spit two times a day  erythromycin   Ointment 1 Application(s) Both EYES every 4 hours  furosemide   Injectable 80 milliGRAM(s) IV Push every 8 hours  heparin  Injectable 5000 Unit(s) SubCutaneous every 8 hours  hydrALAZINE 50 milliGRAM(s) Oral every 8 hours  isosorbide   dinitrate Tablet (ISORDIL) 20 milliGRAM(s) Oral every 8 hours  pantoprazole  Injectable 40 milliGRAM(s) IV Push daily  petrolatum Ophthalmic Ointment 1 Application(s) Both EYES every 6 hours  propofol Infusion 25 MICROgram(s)/kG/Min IV Continuous <Continuous>  ticagrelor 90 milliGRAM(s) Oral two times a day    ALLERGIES:  Allergy Status Unknown

## 2018-06-26 NOTE — PROGRESS NOTE ADULT - ASSESSMENT
47M with PMH of asthma, HTN, substance abuse (cocaine, EtOH, marijuana) here after cardiac arrest, with PEA, now with confirmed anoxic brain injury.

## 2018-06-26 NOTE — DIETITIAN INITIAL EVALUATION ADULT. - ENERGY NEEDS
Height: 72 inches, Weight: 225.7 pounds  BMI: 30.6 kg/m2 IBW: 178 pounds (+/-10%), %IBW: 127%  Pertinent Info: Per chart, 46 y/o male with HTN and polysubstance abuse admitted s/p PEA arrest and ADHF, confirmed anoxic brain injury with poor prognosis, intubated on sedation vacation, palliative following for GOC pending family meeting tomorrow. No edema, no pressure ulcers noted at this time.

## 2018-06-26 NOTE — PROGRESS NOTE ADULT - ASSESSMENT
Pt is a 48 y/o AA M with a PMH of Asthma, HTN, and hx of substance abuse including cocaine, alcohol, marijuana coming in after witnessed cardiac arrest by EMS  transferred from [facility].  Neurological exam significant for PERRL, but no oculocephalic reflex, no corneal reflex, no gag/cough reflex, no spontaneous movement. CTH showing diffuse anoxic brain injury. EEG showing severe diffuse cerebral dysfunction, w/o epileptic or seizure activity.    Recommendations:  - Clinical presentation with very poor prognosis for any meaningful neurological recovery  - Strongly suggest palliative care/end of life care

## 2018-06-26 NOTE — DIETITIAN INITIAL EVALUATION ADULT. - PERTINENT LABORATORY DATA
Na 142 [135 - 145], K+ 3.7 [3.5 - 5.3], BUN 28 [7 - 23], Cr 1.16 [0.50 - 1.30],  [70 - 99], Phos 3.9 [2.5 - 4.5], Alk Phos 95 [40 - 120], AST 64 [10 - 40], ALT 60 [10 - 45], Mg 2.2 [1.6 - 2.6], Ca 9.2 [8.4 - 10.5], HbA1c 6.3% (6/22)

## 2018-06-26 NOTE — PROGRESS NOTE ADULT - ASSESSMENT
48 y/o PMH of Asthma, HTN, and hx of substance abuse including cocaine, alcohol, marijuana coming in after witnessed cardiac arrest by EMS now w/ confirmed anoxic brain injury    CV:  1) Cardiac arrest   - no longer on hypothermia  - c/w central line  - will c/w GOC discussion re: palliative    2) ACS, ST depressions on lateral leads - -bedside echo showed severe LV systolic dysfunction  -cont on ASA, Brilinta and heparin drip; will need to have GOC discussion  - c/w Atorvastatin 80  - will likely hold off on cath given hx of anoxic brain injury    3) Hypertension - major issue  - c/w current BP meds as well as Lasix gtt; now net negative  -continue monitoring,  off nitro drip      Respiratory - hx of asthma  -intubated w/ sedation vacation  -continue with diuresis with lasix 80 TID  -Trend I/Os, monitor ABGs, daily weights    Renal: LALITA resolved  - c/w Lasix gtt; monitor I/O, electrolytes, and Cr on BMP    Neuro: -6/23 EEG provides evidence of a severe diffuse encephalopathy, with a generalized burst suppression pattern seen throughout the recording.  No epileptiform abnormalities were recorded; diffuse anoxic brain injury per CT  -sedation vacation   -f/u palliative recs    Endo: lipid profile shows Cholesterol 134, , HDL 45  - no active issues    GI:   - no active issues  - c/w Jevity TF with goal rate of 60    ID:  - No active issues    Heme: mild leukocytosis; Hgb stable  - no active issues  - will trend CBC    DVT ppx:  - heparin subq    Ethics:   - GOC; currently full code, palliative following 48 y/o PMH of Asthma, HTN, and hx of substance abuse including cocaine, alcohol, marijuana coming in after witnessed cardiac arrest by EMS now w/ confirmed anoxic brain injury    CV:  1) Cardiac arrest   - no longer on hypothermia  - c/w central line  - will c/w GOC discussion re: palliative    2) ACS, ST depressions on lateral leads - -bedside echo showed severe LV systolic dysfunction  -cont on ASA, Brilinta and heparin drip; will need to have GOC discussion  - c/w Atorvastatin 80  - will likely hold off on cath given hx of anoxic brain injury    3) Hypertension - major issue  - c/w current BP meds as well as Lasix gtt; now net negative  -continue monitoring,  off nitro drip     4) Acute heart failure  - 2/2 NSTEMI and cardiac arrest  - trend CVP; will diurese if CVP > 12      Respiratory - hx of asthma  1) acute hypoxic respiratory failure  - evidenced by acute pulmonary edema   -intubated w/ sedation vacation  -continue with diuresis with lasix 80 TID  -Trend I/Os, monitor ABGs, daily weights    Renal: LALITA resolved  - c/w Lasix gtt; monitor I/O, electrolytes, and Cr on BMP    Neuro: -6/23 EEG provides evidence of a severe diffuse encephalopathy, with a generalized burst suppression pattern seen throughout the recording.  No epileptiform abnormalities were recorded; diffuse anoxic brain injury per CT  -sedation vacation   -f/u palliative recs    Endo: lipid profile shows Cholesterol 134, , HDL 45  - no active issues    GI:   - no active issues  - c/w Jevity TF with goal rate of 60    ID:  - No active issues    Heme: mild leukocytosis; Hgb stable  - no active issues  - will trend CBC    DVT ppx:  - heparin subq    Ethics:   - GOC; currently full code, palliative following

## 2018-06-26 NOTE — DIETITIAN INITIAL EVALUATION ADULT. - OTHER INFO
Pt seen for LOS in CCU, limited subjective information obtained at this time as pt intubated, no family at bedside. Observed pt receiving enteral feed of Jevity 1.2 @ goal rate 60cc/hr at time of visit. Pt tolerating without GI distress, +BM today per flowsheet.

## 2018-06-26 NOTE — CHART NOTE - NSCHARTNOTEFT_GEN_A_CORE
====================  CCU MIDNIGHT ROUNDS  ====================    EFREN MEMBRENO  [id number]  Patient is a 47y old  Male who presents with a chief complaint of cardiac arrest (22 Jun 2018 03:47)    ====================  SUMMARY:  ====================    48 y/o PMH of Asthma, HTN, and hx of polysubstance abuse (including cocaine, alcohol, marijuana) coming in after witnessed cardiac arrest by EMS now w/ confirmed anoxic brain injury    ====================  NEW EVENTS:  ====================    GOC conversation on going. Patients wife (who was religiously  to him but not legally  to him, and never domiciled together) was deemed not to have health care decision making capabilities. Based on surrogacy, patients health care decision maker is his daughter. Family meeting planned for tomorrow. Patient febrile to 38.7. Tube feeds held as there were large residuals.     ====================  VITALS (Last 12 hrs):  ====================    T(C): 37.5 (06-26-18 @ 18:00), Max: 37.5 (06-26-18 @ 18:00)  T(F): 99.5 (06-26-18 @ 18:00), Max: 99.5 (06-26-18 @ 18:00)  HR: 102 (06-26-18 @ 20:48) (96 - 106)  BP: --  BP(mean): --  ABP: 122/78 (06-26-18 @ 18:00) (98/64 - 138/74)  ABP(mean): 92 (06-26-18 @ 18:00) (73 - 94)  RR: 24 (06-26-18 @ 18:00) (24 - 26)  SpO2: 100% (06-26-18 @ 20:48) (99% - 100%)  Wt(kg): --  CVP(mm Hg): 5 (06-26-18 @ 18:00) (2 - 5)  CVP(cm H2O): --  CO: --  CI: --  PA: --  PA(mean): --  PCWP: --  SVR: --  PVR: --    I&O's Summary    25 Jun 2018 07:01  -  26 Jun 2018 07:00  --------------------------------------------------------  IN: 2817.1 mL / OUT: 4530 mL / NET: -1712.9 mL    26 Jun 2018 07:01  -  26 Jun 2018 21:43  --------------------------------------------------------  IN: 642 mL / OUT: 1200 mL / NET: -558 mL        Mode: AC/ CMV (Assist Control/ Continuous Mandatory Ventilation)  RR (machine): 24  TV (machine): 500  FiO2: 35  PEEP: 5  ITime: 1  MAP: 11  PIP: 19      ====================  NEW LABS:  ====================                          12.6   16.5  )-----------( 299      ( 26 Jun 2018 05:30 )             38.3     06-26    142  |  101  |  28<H>  ----------------------------<  166<H>  3.7   |  27  |  1.16    Ca    9.2      26 Jun 2018 05:30  Phos  3.9     06-26  Mg     2.2     06-26    TPro  6.9  /  Alb  3.7  /  TBili  0.2  /  DBili  x   /  AST  64<H>  /  ALT  60<H>  /  AlkPhos  95  06-26    PT/INR - ( 26 Jun 2018 05:30 )   PT: 10.9 sec;   INR: 1.01 ratio         PTT - ( 26 Jun 2018 05:30 )  PTT:28.8 sec      ABG - ( 26 Jun 2018 05:18 )  pH, Arterial: 7.45  pH, Blood: x     /  pCO2: 45    /  pO2: 142   / HCO3: 31    / Base Excess: 6.3   /  SaO2: 99                    ====================  A/P:  ====================    6 y/o PMH of Asthma, HTN, and hx of substance abuse including cocaine, alcohol, marijuana coming in after witnessed cardiac arrest by EMS now w/ confirmed anoxic brain injury    CV:  1) Cardiac arrest   - no longer on hypothermia  - c/w central line    2) ACS, ST depressions on lateral leads - -bedside echo showed severe LV systolic dysfunction  - cont on ASA, and heparin drip; GOC discussion ongoing   - c/w Atorvastatin 80  - will likely hold off on cath given hx of anoxic brain injury. Brilinta d/nory today    3) Hypertension   -SBP in the 180's on admission. Now WNL  -continue with Isordil, Hydralazine, and Lasix  -continue monitoring,  off nitro drip      Respiratory - hx of asthma  -intubated   -patient with evidence of pulmonary edema  -continue with diuresis with lasix 80 q8  -Trend I/Os, monitor ABGs, daily weights    Renal:  -LALITA likely prerenal after cardiac arrest  -trend Scr on BMP and monitor I/Os    Neuro: -6/23 EEG provides evidence of a severe diffuse encephalopathy, with a generalized burst suppression pattern seen throughout the recording.  No epileptiform abnormalities were recorded  -will assess mentation after clinical condition improves     Endo: lipid profile shows Cholesterol 134, , HDL 45    GI:   - no active issues  - tube feeds held due to large residuals     ID:  - Patient currently febrile at 38.7.  - will send blood cultures, UA, UC    Heme: mild neutrophil predominant leukocytosis; Hgb stable  - no active issues  - will trend CBC    DVT ppx:  - HSQ    Ethics:   - GOC ongoing.  - family meeting planned for tomorrow

## 2018-06-26 NOTE — DIETITIAN INITIAL EVALUATION ADULT. - NS AS NUTRI INTERV ENTERAL NUTRITION
Recommend to increase goal rate of Jevity 1.2 to 70cc/hr x 24 hrs to provide 2016kcal and 93g protein (25kcal/kg and 1.1g/kg based on IBW of 80.7kg)

## 2018-06-26 NOTE — PROGRESS NOTE ADULT - SUBJECTIVE AND OBJECTIVE BOX
Neurology Follow up note    Subjective: No acute events overnight    Objective:   Vital Signs Last 24 Hrs  T(C): 36.7 (26 Jun 2018 08:00), Max: 38.6 (25 Jun 2018 16:00)  T(F): 98.1 (26 Jun 2018 08:00), Max: 101.5 (25 Jun 2018 16:00)  HR: 100 (26 Jun 2018 09:00) (88 - 130)  BP: --  BP(mean): --  RR: 23 (26 Jun 2018 09:00) (16 - 50)  SpO2: 100% (26 Jun 2018 09:00) (65% - 100%)      General Exam:   General appearance: Intubated and mechanically ventilated, sedated on Propofol           Neurological Exam:  Mental Status:  eyes closed, does not open to verbal, tactile, or noxious stimuli.      Cranial Nerves: PERRL, no corneal reflex, no oculocephalic reflex, no gag/cough reflex    Motor:   Tone: flaccid       Strength:     No withdraw to noxious stimuli    Sensation: No withdraw to noxious stimuli    Deep Tendon Reflexes: 0 bilateral biceps, triceps, brachioradialis, knee and ankle      06-26    142  |  101  |  28<H>  ----------------------------<  166<H>  3.7   |  27  |  1.16    Ca    9.2      26 Jun 2018 05:30  Phos  3.9     06-26  Mg     2.2     06-26    TPro  6.9  /  Alb  3.7  /  TBili  0.2  /  DBili  x   /  AST  64<H>  /  ALT  60<H>  /  AlkPhos  95  06-26 06-26    142  |  101  |  28<H>  ----------------------------<  166<H>  3.7   |  27  |  1.16    Ca    9.2      26 Jun 2018 05:30  Phos  3.9     06-26  Mg     2.2     06-26    TPro  6.9  /  Alb  3.7  /  TBili  0.2  /  DBili  x   /  AST  64<H>  /  ALT  60<H>  /  AlkPhos  95  06-26    LIVER FUNCTIONS - ( 26 Jun 2018 05:30 )  Alb: 3.7 g/dL / Pro: 6.9 g/dL / ALK PHOS: 95 U/L / ALT: 60 U/L / AST: 64 U/L / GGT: x                                 12.6   16.5  )-----------( 299      ( 26 Jun 2018 05:30 )             38.3     MEDICATIONS  (STANDING):  aspirin  chewable 81 milliGRAM(s) Oral daily  atorvastatin 80 milliGRAM(s) Oral at bedtime  chlorhexidine 0.12% Liquid 15 milliLiter(s) Swish and Spit two times a day  erythromycin   Ointment 1 Application(s) Both EYES every 4 hours  heparin  Injectable 5000 Unit(s) SubCutaneous every 8 hours  hydrALAZINE 50 milliGRAM(s) Oral every 8 hours  isosorbide   dinitrate Tablet (ISORDIL) 20 milliGRAM(s) Oral every 8 hours  pantoprazole  Injectable 40 milliGRAM(s) IV Push daily  petrolatum Ophthalmic Ointment 1 Application(s) Both EYES every 6 hours  propofol Infusion 25 MICROgram(s)/kG/Min (15.405 mL/Hr) IV Continuous <Continuous>    MEDICATIONS  (PRN):  artificial  tears Solution 1 Drop(s) Both EYES every 4 hours PRN Dry Eyes

## 2018-06-27 NOTE — CHART NOTE - NSCHARTNOTEFT_GEN_A_CORE
====================  CCU MIDNIGHT ROUNDS  ====================    EFREN MEMBRENO  [id number]  Patient is a 47y old  Male who presents with a chief complaint of cardiac arrest (22 Jun 2018 03:47)    ====================  SUMMARY:  ====================    48 y/o PMH of Asthma, HTN, and hx of polysubstance abuse (including cocaine, alcohol, marijuana) coming in after witnessed cardiac arrest by EMS now w/ confirmed anoxic brain injury    ====================  NEW EVENTS:  ====================    Family meeting rescheduled for tomorrow. Patient made DNR.    ====================  VITALS (Last 12 hrs):  ====================    T(C): 36.7 (06-27-18 @ 19:00), Max: 36.7 (06-27-18 @ 19:00)  T(F): 98 (06-27-18 @ 19:00), Max: 98 (06-27-18 @ 19:00)  HR: 86 (06-27-18 @ 22:00) (76 - 90)  BP: --  BP(mean): --  ABP: 106/58 (06-27-18 @ 22:00) (96/60 - 112/58)  ABP(mean): 74 (06-27-18 @ 22:00) (66 - 76)  RR: 23 (06-27-18 @ 22:00) (23 - 24)  SpO2: 98% (06-27-18 @ 22:00) (97% - 99%)  Wt(kg): --  CVP(mm Hg): --  CVP(cm H2O): --  CO: --  CI: --  PA: --  PA(mean): --  PCWP: --  SVR: --  PVR: --    I&O's Summary    26 Jun 2018 07:01  -  27 Jun 2018 07:00  --------------------------------------------------------  IN: 2566 mL / OUT: 2150 mL / NET: 416 mL    27 Jun 2018 07:01  -  27 Jun 2018 23:05  --------------------------------------------------------  IN: 1481 mL / OUT: 885 mL / NET: 596 mL        Mode: AC/ CMV (Assist Control/ Continuous Mandatory Ventilation)  RR (machine): 24  TV (machine): 550  FiO2: 35  PEEP: 5  ITime: 1  MAP: 9  PIP: 21      ====================  NEW LABS:  ====================                          13.2   17.3  )-----------( 363      ( 27 Jun 2018 02:06 )             40.1     06-27    147<H>  |  103  |  37<H>  ----------------------------<  204<H>  3.9   |  29  |  1.52<H>    Ca    9.2      27 Jun 2018 02:06  Phos  5.4     06-27  Mg     2.6     06-27    TPro  7.6  /  Alb  3.9  /  TBili  0.2  /  DBili  x   /  AST  72<H>  /  ALT  63<H>  /  AlkPhos  107  06-27    PT/INR - ( 27 Jun 2018 02:06 )   PT: 12.0 sec;   INR: 1.10 ratio         PTT - ( 26 Jun 2018 05:30 )  PTT:28.8 sec      ABG - ( 27 Jun 2018 05:10 )  pH, Arterial: 7.32  pH, Blood: x     /  pCO2: 66    /  pO2: 98    / HCO3: 33    / Base Excess: 5.3   /  SaO2: 97                    ====================  A/P:  ====================    48 y/o PMH of Asthma, HTN, and hx of substance abuse including cocaine, alcohol, marijuana coming in after witnessed cardiac arrest by EMS now w/ confirmed anoxic brain injury    CV:  1) Cardiac arrest   - no longer on hypothermia  - c/w central line    2) ACS, ST depressions on lateral leads - -bedside echo showed severe LV systolic dysfunction  - cont on ASA, and heparin drip; GOC discussion ongoing   - c/w Atorvastatin 80  - will likely hold off on cath given hx of anoxic brain injury. Brilinta d/nory today    3) Hypertension   -SBP in the 180's on admission. Now WNL  -continue with Isordil, Hydralazine, and Lasix  -continue monitoring,  off nitro drip      Respiratory - hx of asthma  -intubated   -patient with evidence of pulmonary edema  -continue with diuresis with lasix 80 q8  -Trend I/Os, monitor ABGs, daily weights    Renal:  -LALITA likely prerenal after cardiac arrest  -trend Scr on BMP and monitor I/Os    Neuro: -6/23 EEG provides evidence of a severe diffuse encephalopathy, with a generalized burst suppression pattern seen throughout the recording.  No epileptiform abnormalities were recorded  -will assess mentation after clinical condition improves     Endo: lipid profile shows Cholesterol 134, , HDL 45    GI:   - no active issues  - tube feeds held due to large residuals     ID:  - Patient currently febrile at 38.7.  - will send blood cultures, UA, UC    Heme: mild neutrophil predominant leukocytosis; Hgb stable  - no active issues  - will trend CBC    DVT ppx:  - HSQ    Ethics:   - GOC ongoing.  - family meeting planned for tomorrow.

## 2018-06-27 NOTE — PROGRESS NOTE ADULT - SUBJECTIVE AND OBJECTIVE BOX
CONTACT INFO:  Bill White MD  Internal Medicine PGY1  Pager:  [phone]      HPI / INTERVAL HISTORY:        OBJECTIVE:  VITAL SIGNS:  ICU Vital Signs Last 24 Hrs  T(C): 36.7 (2018 06:00), Max: 39.3 (2018 23:00)  T(F): 98.1 (2018 06:00), Max: 102.7 (2018 23:00)  HR: 84 (:) (84 - 132)  BP: --  BP(mean): --  ABP: 96/54 (:00) (90/52 - 150/78)  ABP(mean): 66 (:00) (64 - 100)  RR: 24 (:) (5 - 65)  SpO2: 95% (:) (94% - 100%)    Mode: AC/ CMV (Assist Control/ Continuous Mandatory Ventilation), RR (machine): 24, TV (machine): 550, FiO2: 35, PEEP: 5, ITime: 1, MAP: 11, PIP: 18     @ : @ 07:00  --------------------------------------------------------  IN: 2817.1 mL / OUT: 4530 mL / NET: -1712.9 mL     @ 07: @ 06:52  --------------------------------------------------------  IN: 2566 mL / OUT: 2150 mL / NET: 416 mL      CAPILLARY BLOOD GLUCOSE          PHYSICAL EXAM:  Gen: NAD  HEENT: NC/AT; PERRL, anicteric sclera  Neck: supple, no JVD  Resp: clear to ausculation B/L; no wheezes, rales or rhonchi  Cardiovasc: S1, S2 normal; RRR; no murmurs, rubs or gallops  GI: soft, nondistended, nontender; +BS  Extr: warm, well-perfused, PT/DP pulses 2+ B/L; no LE edema  Skin: normal color and turgor  Neuro:     LABS:                        13.2   17.3  )-----------( 363      ( 2018 02:06 )             40.1         147<H>  |  103  |  37<H>  ----------------------------<  204<H>  3.9   |  29  |  1.52<H>    Ca    9.2      2018 02:06  Phos  5.4       Mg     2.6         TPro  7.6  /  Alb  3.9  /  TBili  0.2  /  DBili  x   /  AST  72<H>  /  ALT  63<H>  /  AlkPhos  107      LIVER FUNCTIONS - ( 2018 02:06 )  Alb: 3.9 g/dL / Pro: 7.6 g/dL / ALK PHOS: 107 U/L / ALT: 63 U/L / AST: 72 U/L / GGT: x           PT/INR - ( 2018 02:06 )   PT: 12.0 sec;   INR: 1.10 ratio         PTT - ( 2018 05:30 )  PTT:28.8 sec  Urinalysis Basic - ( 2018 22:55 )    Color: Yellow / Appearance: Clear / S.026 / pH: x  Gluc: x / Ketone: Negative  / Bili: Negative / Urobili: Negative   Blood: x / Protein: Trace / Nitrite: Negative   Leuk Esterase: Negative / RBC: x / WBC x   Sq Epi: x / Non Sq Epi: x / Bacteria: x              RADIOLOGY & ADDITIONAL TESTS:       MEDICATIONS:  artificial  tears Solution 1 Drop(s) Both EYES every 4 hours PRN  aspirin  chewable 81 milliGRAM(s) Oral daily  atorvastatin 80 milliGRAM(s) Oral at bedtime  chlorhexidine 0.12% Liquid 15 milliLiter(s) Swish and Spit two times a day  erythromycin   Ointment 1 Application(s) Both EYES every 4 hours  heparin  Injectable 5000 Unit(s) SubCutaneous every 8 hours  hydrALAZINE 50 milliGRAM(s) Oral every 8 hours  isosorbide   dinitrate Tablet (ISORDIL) 20 milliGRAM(s) Oral every 8 hours  norepinephrine Infusion 0.05 MICROgram(s)/kG/Min IV Continuous <Continuous>  pantoprazole  Injectable 40 milliGRAM(s) IV Push daily  petrolatum Ophthalmic Ointment 1 Application(s) Both EYES every 6 hours  piperacillin/tazobactam IVPB. 3.375 Gram(s) IV Intermittent every 8 hours  propofol Infusion 25 MICROgram(s)/kG/Min IV Continuous <Continuous>  vancomycin  IVPB 1000 milliGRAM(s) IV Intermittent every 12 hours      ALLERGIES:  Allergy Status Unknown CONTACT INFO:  Bill White MD  Internal Medicine PGY2  Pager:  [phone]      HPI / INTERVAL HISTORY:  febrile overnight to 102.7, cultures were re-drawn, started on vanc/zosyn; family discussion today around 11 AM with daughter who is HCP and family for final decision; palliative will be present.        OBJECTIVE:  VITAL SIGNS:  ICU Vital Signs Last 24 Hrs  T(C): 36.7 (2018 06:00), Max: 39.3 (2018 23:00)  T(F): 98.1 (2018 06:00), Max: 102.7 (2018 23:00)  HR: 84 (2018 06:00) (84 - 132)  ABP: 96/54 (2018 06:00) (90/52 - 150/78)  ABP(mean): 66 (2018 06:00) (64 - 100)  RR: 24 (2018 06:00) (5 - 65)  SpO2: 95% (:00) (94% - 100%)    Mode: AC/ CMV (Assist Control/ Continuous Mandatory Ventilation), RR (machine): 24, TV (machine): 550, FiO2: 35, PEEP: 5, ITime: 1, MAP: 11, PIP: 18    06-26 @ 07:01  -  06-27 @ 06:52  --------------------------------------------------------  IN: 2566 mL / OUT: 2150 mL / NET: 416 mL      CAPILLARY BLOOD GLUCOSE    PHYSICAL EXAM:  Gen: intubated  Resp: clear to ausculation B/L; no wheezes, rales or rhonchi  Cardiovasc: S1, S2 normal; RRR; no murmurs, rubs or gallops  GI: soft, nondistended, nontender; +BS  Extr: warm, well-perfused, PT/DP pulses 2+ B/L; no LE edema  Skin: normal color and turgor    LABS:                        13.2   17.3  )-----------( 363      ( 2018 02:06 )             40.1     06-27    147<H>  |  103  |  37<H>  ----------------------------<  204<H>  3.9   |  29  |  1.52<H>    Ca    9.2      2018 02:06  Phos  5.4       Mg     2.6         TPro  7.6  /  Alb  3.9  /  TBili  0.2  /  DBili  x   /  AST  72<H>  /  ALT  63<H>  /  AlkPhos  107      LIVER FUNCTIONS - ( 2018 02:06 )  Alb: 3.9 g/dL / Pro: 7.6 g/dL / ALK PHOS: 107 U/L / ALT: 63 U/L / AST: 72 U/L / GGT: x           PT/INR - ( 2018 02:06 )   PT: 12.0 sec;   INR: 1.10 ratio         PTT - ( 2018 05:30 )  PTT:28.8 sec  Urinalysis Basic - ( 2018 22:55 )    Color: Yellow / Appearance: Clear / S.026 / pH: x  Gluc: x / Ketone: Negative  / Bili: Negative / Urobili: Negative   Blood: x / Protein: Trace / Nitrite: Negative   Leuk Esterase: Negative / RBC: x / WBC x   Sq Epi: x / Non Sq Epi: x / Bacteria: x    RADIOLOGY & ADDITIONAL TESTS:     MEDICATIONS:  artificial  tears Solution 1 Drop(s) Both EYES every 4 hours PRN  aspirin  chewable 81 milliGRAM(s) Oral daily  atorvastatin 80 milliGRAM(s) Oral at bedtime  chlorhexidine 0.12% Liquid 15 milliLiter(s) Swish and Spit two times a day  erythromycin   Ointment 1 Application(s) Both EYES every 4 hours  heparin  Injectable 5000 Unit(s) SubCutaneous every 8 hours  hydrALAZINE 50 milliGRAM(s) Oral every 8 hours  isosorbide   dinitrate Tablet (ISORDIL) 20 milliGRAM(s) Oral every 8 hours  norepinephrine Infusion 0.05 MICROgram(s)/kG/Min IV Continuous <Continuous>  pantoprazole  Injectable 40 milliGRAM(s) IV Push daily  petrolatum Ophthalmic Ointment 1 Application(s) Both EYES every 6 hours  piperacillin/tazobactam IVPB. 3.375 Gram(s) IV Intermittent every 8 hours  propofol Infusion 25 MICROgram(s)/kG/Min IV Continuous <Continuous>  vancomycin  IVPB 1000 milliGRAM(s) IV Intermittent every 12 hours      ALLERGIES:  Allergy Status Unknown

## 2018-06-27 NOTE — PROGRESS NOTE ADULT - SUBJECTIVE AND OBJECTIVE BOX
HPI: 47M with PMH of asthma, HTN, substance abuse (cocaine, EtOH, marijuana) here after cardiac arrest, with PEA, now with confirmed anoxic brain injury.     INTERVAL EVENTS: worsening status, uptrending SCr, runs of NSVT, and absent gag reflex portending a worsening prognosis.    ADVANCE DIRECTIVES:    DNR [ ] YES [X ] NO                            [ ] Completed  MOLST  [ ] YES [ ] NO                      [ ] Completed  Health Care Proxy [ ] YES  [ ] NO   [ ] Completed  Living Will  [ ] YES [ ] NO             [ X] Surrogate  [ ] HCP  [ ] Guardian: daughter Chiquita [phone]                                     ALLERGIES:   Allergies    Allergy Status Unknown    Intolerances    MEDICATIONS:   MEDICATIONS  (STANDING):  aspirin  chewable 81 milliGRAM(s) Oral daily  atorvastatin 80 milliGRAM(s) Oral at bedtime  chlorhexidine 0.12% Liquid 15 milliLiter(s) Swish and Spit two times a day  erythromycin   Ointment 1 Application(s) Both EYES every 4 hours  heparin  Injectable 5000 Unit(s) SubCutaneous every 8 hours  hydrALAZINE 50 milliGRAM(s) Oral every 8 hours  isosorbide   dinitrate Tablet (ISORDIL) 20 milliGRAM(s) Oral every 8 hours  pantoprazole  Injectable 40 milliGRAM(s) IV Push daily  petrolatum Ophthalmic Ointment 1 Application(s) Both EYES every 6 hours  propofol Infusion 25 MICROgram(s)/kG/Min (15.405 mL/Hr) IV Continuous <Continuous>    MEDICATIONS  (PRN):  artificial  tears Solution 1 Drop(s) Both EYES every 4 hours PRN Dry Eyes        PRESENT SYMPTOMS:  Source: [ ] Patient   [ ] Family   [ X] Team     Pain:                        [X ] No [ ] Yes             [ ] Mild [ ] Moderate [ ] Severe    Onset -  Location -  Duration -  Character -  Alleviating/Aggravating -  Radiation -  Timing -      Dyspnea:                [X ] No [ ] Yes             [ ] Mild [ ] Moderate [ ] Severe    Anxiety:                  [ ] No [ ] Yes             [ ] Mild [ ] Moderate [ ] Severe    Fatigue:                  [ ] No [X ] Yes             [ ] Mild [ ] Moderate [X ] Severe     Nausea:                  [ ] No [ ] Yes             [ ] Mild [ ] Moderate [ ] Severe    Loss of appetite:   [ ] No [ ] Yes             [ ] Mild [ ] Moderate [ ] Severe    Constipation:        [ ] No [ ] Yes             [ ] Mild [ ] Moderate [ ] Severe    Other Symptoms:  [ ] All other review of systems negative   [X ] Unable to obtain due to poor mentation     Karnofsky Performance Score/Palliative Performance Status Version 2:  <30%    PHYSICAL EXAM:  Vital Signs Last 24 Hrs  T(C): 36.7 (27 Jun 2018 06:00), Max: 39.3 (26 Jun 2018 23:00)  T(F): 98.1 (27 Jun 2018 06:00), Max: 102.7 (26 Jun 2018 23:00)  HR: 80 (27 Jun 2018 14:00) (76 - 132)  BP: --  BP(mean): --  RR: 24 (27 Jun 2018 14:00) (5 - 65)  SpO2: 99% (27 Jun 2018 14:00) (94% - 100%)    Exam deferred today    LABS: reviewed                          13.2   17.3  )-----------( 363      ( 27 Jun 2018 02:06 )             40.1     06-27    147<H>  |  103  |  37<H>  ----------------------------<  204<H>  3.9   |  29  |  1.52<H>    Ca    9.2      27 Jun 2018 02:06  Phos  5.4     06-27  Mg     2.6     06-27        Shock: [ ] Septic [ ] Cardiogenic [ ] Neurologic [ ] Hypovolemic  Vasopressors x 0  Inotrophs x 0    Protein Calorie Malnutrition: [ ] Mild [ ] Moderate [ ] Severe  Oral Intake: [ ] Unable/mouth care only [ ] Minimal [ ] Moderate [ ] Full Capability  Diet: [ ] NPO [ ] Tube feeds [ ] TPN [ ] Other     RADIOLOGY & ADDITIONAL STUDIES: CT head 6/24: diffuse anoxic brain injury.    REFERRALS:   [ ] Chaplaincy  [ ] Hospice  [ ] Child Life  [ ] Social Work  [ ] Case management [ ] Holistic Therapy

## 2018-06-27 NOTE — PROGRESS NOTE ADULT - ASSESSMENT
46 y/o PMH of Asthma, HTN, and hx of substance abuse including cocaine, alcohol, marijuana coming in after witnessed cardiac arrest by EMS now w/ confirmed anoxic brain injury    CV:  1) Cardiac arrest   - no longer on hypothermia  - c/w central line  - will c/w GOC discussion re: palliative    2) ACS, ST depressions on lateral leads - -bedside echo showed severe LV systolic dysfunction  -cont on ASA, Brilinta and heparin drip; will need to have GOC discussion  - c/w Atorvastatin 80  - will likely hold off on cath given hx of anoxic brain injury    3) Hypertension - major issue  - c/w current BP meds as well as Lasix gtt; now net negative  -continue monitoring,  off nitro drip     4) Acute heart failure  - 2/2 NSTEMI and cardiac arrest  - trend CVP; will diurese if CVP > 12      Respiratory - hx of asthma  1) acute hypoxic respiratory failure  - evidenced by acute pulmonary edema   -intubated w/ sedation vacation  -continue with diuresis with lasix 80 TID  -Trend I/Os, monitor ABGs, daily weights    Renal: LALITA resolved  - c/w Lasix gtt; monitor I/O, electrolytes, and Cr on BMP    Neuro: -6/23 EEG provides evidence of a severe diffuse encephalopathy, with a generalized burst suppression pattern seen throughout the recording.  No epileptiform abnormalities were recorded; diffuse anoxic brain injury per CT  -sedation vacation   -f/u palliative recs    Endo: lipid profile shows Cholesterol 134, , HDL 45  - no active issues    GI:   - no active issues  - c/w Jevity TF with goal rate of 60    ID:  - No active issues    Heme: mild leukocytosis; Hgb stable  - no active issues  - will trend CBC    DVT ppx:  - heparin subq    Ethics:   - GOC; currently full code, palliative following 48 y/o PMH of Asthma, HTN, and hx of substance abuse including cocaine, alcohol, marijuana coming in after witnessed cardiac arrest by EMS now w/ confirmed anoxic brain injury    CV:  1) Cardiac arrest   - no longer on hypothermia  - c/w central line  - will c/w GOC discussion re: palliative; discussion today at 11 AM    2) ACS, ST depressions on lateral leads - -bedside echo showed severe LV systolic dysfunction  -cont on ASA,  will need to have GOC discussion  - c/w Atorvastatin 80  - no cath given anoxic brain injury    3) Hypertension - major issue  - c/w current BP meds; off Lasix gtt  -continue monitoring,  off nitro drip     4) Acute heart failure  - 2/2 NSTEMI and cardiac arrest  - trend CVP; will diurese if CVP > 12      Respiratory - hx of asthma  1) acute hypoxic respiratory failure  - evidenced by acute pulmonary edema   -intubated w/ sedation vacation  -hold lasix; will diurese if CVP > 12   -Trend I/Os, monitor ABGs, daily weights    Renal: LALITA 2/2 overdiuresis ; tube feeds   - c/w Lasix gtt; monitor I/O, electrolytes, and Cr on BMP    Neuro: -6/23 EEG provides evidence of a severe diffuse encephalopathy, with a generalized burst suppression pattern seen throughout the recording.  No epileptiform abnormalities were recorded; diffuse anoxic brain injury per CT  -sedation vacation   -f/u palliative recs    Endo: lipid profile shows Cholesterol 134, , HDL 45  - no active issues    GI:   - no active issues  - c/w Jevity TF with goal rate of 60    ID:  - No active issues    Heme: mild leukocytosis; Hgb stable  - no active issues  - will trend CBC    DVT ppx:  - heparin subq    Ethics:   - GOC; currently full code, palliative following

## 2018-06-28 NOTE — GOALS OF CARE CONVERSATION - PERSONAL ADVANCE DIRECTIVE - CONVERSATION DETAILS
Reviewed current medical situation and prognosis with family, and explained that there is no significant hope for recovery given extent of neurological injury. In light of this, and with their knowledge of what Mr. Hdz would want, they decided to pursue elective withdrawal of ventilation in CCU. PCU was offered as an alternative location, but they declined. Chaplaincy offered, but they stated patient's wife (religiously ) is organizing Islamic  rites and practices. Emotional support provided to family.

## 2018-06-28 NOTE — PROGRESS NOTE ADULT - PROBLEM SELECTOR PLAN 1
Plan per CCU team, poor prognosis, intubated

## 2018-06-28 NOTE — PROGRESS NOTE ADULT - ATTENDING COMMENTS
Patient is seen and examined with fellow, NP and the CCU house-staff. I agree with the history, physical and the assessment and plan.  cardiac arrest - intubated - with evidence of severe anoxic brain injury  f/u with palliative care and family discussion  will contact organ donation
agree with above note
Patient is seen and examined with fellow, NP and the CCU house-staff. I agree with the history, physical and the assessment and plan.  PEA cardiac arrest - intubated  f/u with EEG
Patient is seen and examined with fellow, NP and the CCU house-staff. I agree with the history, physical and the assessment and plan.  cardiac arrest - intubated - with evidence of anoxic brain injury  awaiting family meeting with palliative care
Patient is seen and examined with fellow, NP and the CCU house-staff. I agree with the history, physical and the assessment and plan.  cardiac arrest with evidence of anoxic brain injury

## 2018-06-28 NOTE — PROGRESS NOTE ADULT - PROBLEM SELECTOR PLAN 3
Family meeting cancelled as family was unable to come to the hospital. Discussed with surrogate Chiquita Post (daughter) on phone, explained gravity of situation in light of worsening signs of neurological, cardiac, and renal deterioration. She expressed understanding, and stated she will be available at 11AM tomorrow for a family meeting, and is aware patient's prognosis is guarded and he could pass before the meeting. She agreed to DNR. Discussed with primary team.
Family meeting held regarding current care, see GOC note.
Family meeting held with patient's aunt, cousin, and cousin-once-removed. Discussed current medical condition, with cardiology team offering medical information. Patient has been incarcerated many times, and has a significant other (who may be religiously but not legally  to him) who he did not co-domicile or share finances and resources with. Given no HCP form, based on surrogacy, surrogate will be his daughter Chiquita ([phone]). Aunt Elizabet Hdz ([phone]) is also heavily involved in care, and they agree with having a family meeting tomorrow at 11AM to discuss code status, advanced care planning, and withdrawal of care. I discussed personally with Chiquita over phone regarding code status, but she does not want to make any decisions at this time to limit care. Family will invite patient's significant other Hilda ([phone]) to meeting as well.

## 2018-06-28 NOTE — PROGRESS NOTE ADULT - ASSESSMENT
46 y/o PMH of Asthma, HTN, and hx of substance abuse including cocaine, alcohol, marijuana coming in after witnessed cardiac arrest by EMS now w/ confirmed anoxic brain injury    CV:  1) Cardiac arrest   - no longer on hypothermia  - c/w central line  - will c/w GOC discussion re: palliative; discussion today at 11 AM    2) ACS, ST depressions on lateral leads - -bedside echo showed severe LV systolic dysfunction  -cont on ASA,  will need to have GOC discussion  - c/w Atorvastatin 80  - no cath given anoxic brain injury    3) Hypertension - major issue  - c/w current BP meds; off Lasix gtt  -continue monitoring,  off nitro drip     4) Acute heart failure  - 2/2 NSTEMI and cardiac arrest  - trend CVP; will diurese if CVP > 12      Respiratory - hx of asthma  1) acute hypoxic respiratory failure  - evidenced by acute pulmonary edema   -intubated w/ sedation vacation  -hold lasix; will diurese if CVP > 12   -Trend I/Os, monitor ABGs, daily weights    Renal: LALITA 2/2 overdiuresis ; tube feeds   - c/w Lasix gtt; monitor I/O, electrolytes, and Cr on BMP    Neuro: -6/23 EEG provides evidence of a severe diffuse encephalopathy, with a generalized burst suppression pattern seen throughout the recording.  No epileptiform abnormalities were recorded; diffuse anoxic brain injury per CT  -sedation vacation   -f/u palliative recs    Endo: lipid profile shows Cholesterol 134, , HDL 45  - no active issues    GI:   - no active issues  - c/w Jevity TF with goal rate of 60    ID:  - No active issues    Heme: mild leukocytosis; Hgb stable  - no active issues  - will trend CBC    DVT ppx:  - heparin subq    Ethics:   - GOC; currently full code, palliative following 46 y/o PMH of Asthma, HTN, and hx of substance abuse including cocaine, alcohol, marijuana coming in after witnessed cardiac arrest by EMS now w/ confirmed anoxic brain injury    CV:  1) Cardiac arrest   - no longer on hypothermia  - c/w central line  - will c/w GOC discussion re: palliative; discussion today at 11:30 AM    2) ACS, ST depressions on lateral leads - -bedside echo showed severe LV systolic dysfunction  -cont on ASA,  will need to have GOC discussion  - c/w Atorvastatin 80  - no cath given anoxic brain injury    3) Hypertension - major issue  - c/w current BP meds; off Lasix gtt  -continue monitoring,  off nitro drip     4) Acute heart failure  - 2/2 NSTEMI and cardiac arrest  - trend CVP; will diurese if CVP > 12      Respiratory - hx of asthma  1) acute hypoxic respiratory failure  - evidenced by acute pulmonary edema   -intubated w/ sedation vacation  -hold lasix; will diurese if CVP > 12   -Trend I/Os, monitor ABGs, daily weights    Renal: LALITA 2/2 overdiuresis ; tube feeds   - c/w Lasix gtt; monitor I/O, electrolytes, and Cr on BMP    Neuro: -6/23 EEG provides evidence of a severe diffuse encephalopathy, with a generalized burst suppression pattern seen throughout the recording.  No epileptiform abnormalities were recorded; diffuse anoxic brain injury per CT  -sedation vacation   -f/u palliative recs    Endo: lipid profile shows Cholesterol 134, , HDL 45  - no active issues    GI:   - no active issues  - c/w Jevity TF with goal rate of 60    ID:  - No active issues    Heme: mild leukocytosis; Hgb stable  - no active issues  - will trend CBC    DVT ppx:  - heparin subq    Ethics:   - GOC; currently full code, palliative following

## 2018-06-28 NOTE — CHART NOTE - NSCHARTNOTEFT_GEN_A_CORE
====================  CCU MIDNIGHT ROUNDS  ====================    EFREN MEMBRENO  [id number]  Patient is a 47y old  Male who presents with a chief complaint of cardiac arrest (22 Jun 2018 03:47)    ====================  SUMMARY:  ====================    46 y/o PMH of Asthma, HTN, and hx of polysubstance abuse (including cocaine, alcohol, marijuana) coming in after witnessed cardiac arrest by EMS now w/ confirmed anoxic brain injury    ====================  NEW EVENTS:  ====================    Family meeting had. Family decided on elective extubation. Patient no comfort measures    ====================  VITALS (Last 12 hrs):  ====================    T(C): 40 (06-28-18 @ 19:00), Max: 40 (06-28-18 @ 19:00)  T(F): 104 (06-28-18 @ 19:00), Max: 104 (06-28-18 @ 19:00)  HR: 114 (06-28-18 @ 22:00) (108 - 146)  BP: --  BP(mean): --  ABP: 64/44 (06-28-18 @ 22:00) (64/42 - 114/66)  ABP(mean): 52 (06-28-18 @ 22:00) (50 - 78)  RR: 43 (06-28-18 @ 22:00) (19 - 43)  SpO2: 86% (06-28-18 @ 22:00) (52% - 98%)  Wt(kg): --  CVP(mm Hg): --  CVP(cm H2O): --  CO: --  CI: --  PA: --  PA(mean): --  PCWP: --  SVR: --  PVR: --    I&O's Summary    27 Jun 2018 07:01  -  28 Jun 2018 07:00  --------------------------------------------------------  IN: 2513.8 mL / OUT: 1370 mL / NET: 1143.8 mL    28 Jun 2018 07:01  -  28 Jun 2018 22:28  --------------------------------------------------------  IN: 755.8 mL / OUT: 445 mL / NET: 310.8 mL        Mode: AC/ CMV (Assist Control/ Continuous Mandatory Ventilation)  RR (machine): 24  TV (machine): 550  FiO2: 35  PEEP: 5  ITime: 1  MAP: 12  PIP: 23      ====================  NEW LABS:  ====================                          11.9   14.2  )-----------( 324      ( 28 Jun 2018 04:03 )             37.4     06-28    151<H>  |  109<H>  |  41<H>  ----------------------------<  151<H>  3.9   |  30  |  1.24    Ca    9.6      28 Jun 2018 04:03  Phos  3.6     06-28  Mg     2.5     06-28    TPro  7.4  /  Alb  3.9  /  TBili  0.3  /  DBili  x   /  AST  104<H>  /  ALT  99<H>  /  AlkPhos  103  06-28    PT/INR - ( 27 Jun 2018 02:06 )   PT: 12.0 sec;   INR: 1.10 ratio               ABG - ( 27 Jun 2018 05:10 )  pH, Arterial: 7.32  pH, Blood: x     /  pCO2: 66    /  pO2: 98    / HCO3: 33    / Base Excess: 5.3   /  SaO2: 97                    ====================  A/P:  ====================      46 y/o PMH of Asthma, HTN, and hx of substance abuse including cocaine, alcohol, marijuana coming in after witnessed cardiac arrest by EMS now w/ confirmed anoxic brain injury    CV:  1) Cardiac arrest   - no longer on hypothermia  - c/w central line    2) ACS, ST depressions on lateral leads - -bedside echo showed severe LV systolic dysfunction  - cont on ASA, and heparin drip; GOC discussion ongoing   - c/w Atorvastatin 80  - will likely hold off on cath given hx of anoxic brain injury. Brilinta d/nory today    3) Hypertension   -SBP in the 180's on admission. Now WNL  -continue with Isordil, Hydralazine, and Lasix  -continue monitoring,  off nitro drip      Respiratory - hx of asthma  -intubated   -patient with evidence of pulmonary edema  -continue with diuresis with lasix 80 q8  -Trend I/Os, monitor ABGs, daily weights    Renal:  -LALITA likely prerenal after cardiac arrest  -trend Scr on BMP and monitor I/Os    Neuro: -6/23 EEG provides evidence of a severe diffuse encephalopathy, with a generalized burst suppression pattern seen throughout the recording.  No epileptiform abnormalities were recorded  -will assess mentation after clinical condition improves     Endo: lipid profile shows Cholesterol 134, , HDL 45    GI:   - no active issues  - tube feeds held due to large residuals     ID:  - Patient currently febrile at 38.7.  - will send blood cultures, UA, UC    Heme: mild neutrophil predominant leukocytosis; Hgb stable  - no active issues  - will trend CBC    DVT ppx:  - HSQ    Ethics:   - DNR/DNI  - patient electively extubated

## 2018-06-28 NOTE — PROGRESS NOTE ADULT - PROBLEM SELECTOR PLAN 2
Poor prognosis for recovery given diffuse anoxic injury

## 2018-06-28 NOTE — PROGRESS NOTE ADULT - SUBJECTIVE AND OBJECTIVE BOX
HPI: 47M with PMH of asthma, HTN, substance abuse (cocaine, EtOH, marijuana) here after cardiac arrest, with PEA, now with confirmed anoxic brain injury.     INTERVAL EVENTS: worsening status, poor prognosis    ADVANCE DIRECTIVES:    DNR [ ] YES [X ] NO                            [ ] Completed  MOLST  [ ] YES [ ] NO                      [ ] Completed  Health Care Proxy [ ] YES  [ ] NO   [ ] Completed  Living Will  [ ] YES [ ] NO             [ X] Surrogate  [ ] HCP  [ ] Guardian: daughter Chiquita [phone]                                     ALLERGIES:   Allergies    Allergy Status Unknown    Intolerances    MEDICATIONS:   MEDICATIONS  (STANDING):  aspirin  chewable 81 milliGRAM(s) Oral daily  atorvastatin 80 milliGRAM(s) Oral at bedtime  chlorhexidine 0.12% Liquid 15 milliLiter(s) Swish and Spit two times a day  dextrose 5%. 1000 milliLiter(s) (75 mL/Hr) IV Continuous <Continuous>  erythromycin   Ointment 1 Application(s) Both EYES every 4 hours  heparin  Injectable 5000 Unit(s) SubCutaneous every 8 hours  hydrALAZINE 50 milliGRAM(s) Oral every 8 hours  isosorbide   dinitrate Tablet (ISORDIL) 20 milliGRAM(s) Oral every 8 hours  morphine  Infusion 1 mG/Hr (1 mL/Hr) IV Continuous <Continuous>  pantoprazole  Injectable 40 milliGRAM(s) IV Push daily  petrolatum Ophthalmic Ointment 1 Application(s) Both EYES every 6 hours  propofol Infusion 25 MICROgram(s)/kG/Min (15.405 mL/Hr) IV Continuous <Continuous>    MEDICATIONS  (PRN):  artificial  tears Solution 1 Drop(s) Both EYES every 4 hours PRN Dry Eyes  LORazepam   Injectable 0.5 milliGRAM(s) IV Push every 20 minutes PRN Agitation  morphine  - Injectable 2 milliGRAM(s) IV Push every 15 minutes PRN dyspnea after extubation  morphine  - Injectable 2 milliGRAM(s) IV Push once PRN 15 minutes prior to extubation        PRESENT SYMPTOMS:  Source: [ ] Patient   [ ] Family   [ X] Team     Pain:                        [X ] No [ ] Yes             [ ] Mild [ ] Moderate [ ] Severe    Onset -  Location -  Duration -  Character -  Alleviating/Aggravating -  Radiation -  Timing -      Dyspnea:                [X ] No [ ] Yes             [ ] Mild [ ] Moderate [ ] Severe    Anxiety:                  [ ] No [ ] Yes             [ ] Mild [ ] Moderate [ ] Severe    Fatigue:                  [ ] No [X ] Yes             [ ] Mild [ ] Moderate [X ] Severe     Nausea:                  [ ] No [ ] Yes             [ ] Mild [ ] Moderate [ ] Severe    Loss of appetite:   [ ] No [ ] Yes             [ ] Mild [ ] Moderate [ ] Severe    Constipation:        [ ] No [ ] Yes             [ ] Mild [ ] Moderate [ ] Severe    Other Symptoms:  [ ] All other review of systems negative   [X ] Unable to obtain due to poor mentation     Karnofsky Performance Score/Palliative Performance Status Version 2:  <30%    PHYSICAL EXAM:  Vital Signs Last 24 Hrs  T(C): 38.7 (28 Jun 2018 12:00), Max: 38.7 (28 Jun 2018 12:00)  T(F): 101.7 (28 Jun 2018 12:00), Max: 101.7 (28 Jun 2018 12:00)  HR: 108 (28 Jun 2018 12:00) (80 - 114)  BP: 103/58 (28 Jun 2018 06:00) (103/58 - 103/58)  BP(mean): 68 (28 Jun 2018 06:00) (68 - 68)  RR: 23 (28 Jun 2018 12:00) (19 - 24)  SpO2: 98% (28 Jun 2018 12:00) (95% - 100%)    Exam deferred today    LABS: reviewed                          11.9   14.2  )-----------( 324      ( 28 Jun 2018 04:03 )             37.4     06-28    151<H>  |  109<H>  |  41<H>  ----------------------------<  151<H>  3.9   |  30  |  1.24    Ca    9.6      28 Jun 2018 04:03  Phos  3.6     06-28  Mg     2.5     06-28    Shock: [ ] Septic [ ] Cardiogenic [ ] Neurologic [ ] Hypovolemic  Vasopressors x 0  Inotrophs x 0    Protein Calorie Malnutrition: [ ] Mild [ ] Moderate [ ] Severe  Oral Intake: [ ] Unable/mouth care only [ ] Minimal [ ] Moderate [ ] Full Capability  Diet: [ ] NPO [ ] Tube feeds [ ] TPN [ ] Other     RADIOLOGY & ADDITIONAL STUDIES: CT head 6/24: diffuse anoxic brain injury.    REFERRALS:   [ ] Chaplaincy  [ ] Hospice  [ ] Child Life  [ ] Social Work  [ ] Case management [ ] Holistic Therapy

## 2018-06-28 NOTE — PROGRESS NOTE ADULT - SUBJECTIVE AND OBJECTIVE BOX
CONTACT INFO:  Bill White MD  Internal Medicine PGY1  Pager:  [phone]      HPI / INTERVAL HISTORY:        OBJECTIVE:  VITAL SIGNS:  ICU Vital Signs Last 24 Hrs  T(C): 37.3 (2018 03:00), Max: 37.3 (2018 03:00)  T(F): 99.1 (2018 03:00), Max: 99.1 (2018 03:00)  HR: 102 (2018 06:00) (76 - 108)  BP: 103/58 (2018 06:00) (103/58 - 103/58)  BP(mean): 68 (2018 06:00) (68 - 68)  ABP: 90/54 (2018 06:00) (84/50 - 144/74)  ABP(mean): 62 (:00) (60 - 94)  RR: 24 (:00) (19 - 24)  SpO2: 95% (:00) (95% - 100%)    Mode: AC/ CMV (Assist Control/ Continuous Mandatory Ventilation), RR (machine): 24, TV (machine): 550, FiO2: 35, PEEP: 5, ITime: 1, MAP: 12, PIP: 21    0626 @ 07: @ 07:00  --------------------------------------------------------  IN: 2566 mL / OUT: 2150 mL / NET: 416 mL     @ 07: @ 06:53  --------------------------------------------------------  IN: 2369.6 mL / OUT: 1320 mL / NET: 1049.6 mL      CAPILLARY BLOOD GLUCOSE          PHYSICAL EXAM:  Gen: NAD  HEENT: NC/AT; PERRL, anicteric sclera  Neck: supple, no JVD  Resp: clear to ausculation B/L; no wheezes, rales or rhonchi  Cardiovasc: S1, S2 normal; RRR; no murmurs, rubs or gallops  GI: soft, nondistended, nontender; +BS  Extr: warm, well-perfused, PT/DP pulses 2+ B/L; no LE edema  Skin: normal color and turgor  Neuro:     LABS:                        11.9   14.2  )-----------( 324      ( 2018 04:03 )             37.4         151<H>  |  109<H>  |  41<H>  ----------------------------<  151<H>  3.9   |  30  |  1.24    Ca    9.6      2018 04:03  Phos  3.6       Mg     2.5         TPro  7.4  /  Alb  3.9  /  TBili  0.3  /  DBili  x   /  AST  104<H>  /  ALT  99<H>  /  AlkPhos  103      LIVER FUNCTIONS - ( 2018 04:03 )  Alb: 3.9 g/dL / Pro: 7.4 g/dL / ALK PHOS: 103 U/L / ALT: 99 U/L / AST: 104 U/L / GGT: x           PT/INR - ( 2018 02:06 )   PT: 12.0 sec;   INR: 1.10 ratio           Urinalysis Basic - ( 2018 22:55 )    Color: Yellow / Appearance: Clear / S.026 / pH: x  Gluc: x / Ketone: Negative  / Bili: Negative / Urobili: Negative   Blood: x / Protein: Trace / Nitrite: Negative   Leuk Esterase: Negative / RBC: x / WBC x   Sq Epi: x / Non Sq Epi: x / Bacteria: x    RADIOLOGY & ADDITIONAL TESTS:       MEDICATIONS:  artificial  tears Solution 1 Drop(s) Both EYES every 4 hours PRN  aspirin  chewable 81 milliGRAM(s) Oral daily  atorvastatin 80 milliGRAM(s) Oral at bedtime  chlorhexidine 0.12% Liquid 15 milliLiter(s) Swish and Spit two times a day  dextrose 5%. 1000 milliLiter(s) IV Continuous <Continuous>  erythromycin   Ointment 1 Application(s) Both EYES every 4 hours  heparin  Injectable 5000 Unit(s) SubCutaneous every 8 hours  hydrALAZINE 50 milliGRAM(s) Oral every 8 hours  isosorbide   dinitrate Tablet (ISORDIL) 20 milliGRAM(s) Oral every 8 hours  pantoprazole  Injectable 40 milliGRAM(s) IV Push daily  petrolatum Ophthalmic Ointment 1 Application(s) Both EYES every 6 hours  propofol Infusion 25 MICROgram(s)/kG/Min IV Continuous <Continuous>      ALLERGIES:  Allergy Status Unknown CONTACT INFO:  Bill White MD  Internal Medicine PGY2  Pager:  [phone]    HPI / INTERVAL HISTORY:  no events o/n, Na trending up started on D5 @ 75 cc/hr; GOC discussion at 11:30 AM w/ family    OBJECTIVE:  VITAL SIGNS:  ICU Vital Signs Last 24 Hrs  T(C): 37.3 (2018 03:00), Max: 37.3 (2018 03:00)  T(F): 99.1 (2018 03:00), Max: 99.1 (2018 03:00)  HR: 102 (2018 06:00) (76 - 108)  BP: 103/58 (2018 06:00) (103/58 - 103/58)  BP(mean): 68 (2018 06:00) (68 - 68)  ABP: 90/54 (:00) (84/50 - 144/74)  ABP(mean): 62 (2018 06:00) (60 - 94)  RR: 24 (2018 06:00) (19 - 24)  SpO2: 95% (2018 06:00) (95% - 100%)    Mode: AC/ CMV (Assist Control/ Continuous Mandatory Ventilation), RR (machine): 24, TV (machine): 550, FiO2: 35, PEEP: 5, ITime: 1, MAP: 12, PIP: 21    06-27 @ 07:01  -  28 @ 06:53  --------------------------------------------------------  IN: 2369.6 mL / OUT: 1320 mL / NET: 1049.6 mL      CAPILLARY BLOOD GLUCOSE    PHYSICAL EXAM:  Gen: intubated  HEENT: NC/AT; PERRL, anicteric sclera  Neck: supple, no JVD  Resp: clear to ausculation B/L; no wheezes, rales or rhonchi  Cardiovasc: S1, S2 normal; RRR; no murmurs, rubs or gallops  GI: soft, nondistended, nontender; +BS    LABS:                        11.9   14.2  )-----------( 324      ( 2018 04:03 )             37.4         151<H>  |  109<H>  |  41<H>  ----------------------------<  151<H>  3.9   |  30  |  1.24    Ca    9.6      2018 04:03  Phos  3.6       Mg     2.5         TPro  7.4  /  Alb  3.9  /  TBili  0.3  /  DBili  x   /  AST  104<H>  /  ALT  99<H>  /  AlkPhos  103      LIVER FUNCTIONS - ( 2018 04:03 )  Alb: 3.9 g/dL / Pro: 7.4 g/dL / ALK PHOS: 103 U/L / ALT: 99 U/L / AST: 104 U/L / GGT: x           PT/INR - ( 2018 02:06 )   PT: 12.0 sec;   INR: 1.10 ratio           Urinalysis Basic - ( 2018 22:55 )    Color: Yellow / Appearance: Clear / S.026 / pH: x  Gluc: x / Ketone: Negative  / Bili: Negative / Urobili: Negative   Blood: x / Protein: Trace / Nitrite: Negative   Leuk Esterase: Negative / RBC: x / WBC x   Sq Epi: x / Non Sq Epi: x / Bacteria: x    RADIOLOGY & ADDITIONAL TESTS:       MEDICATIONS:  artificial  tears Solution 1 Drop(s) Both EYES every 4 hours PRN  aspirin  chewable 81 milliGRAM(s) Oral daily  atorvastatin 80 milliGRAM(s) Oral at bedtime  chlorhexidine 0.12% Liquid 15 milliLiter(s) Swish and Spit two times a day  dextrose 5%. 1000 milliLiter(s) IV Continuous <Continuous>  erythromycin   Ointment 1 Application(s) Both EYES every 4 hours  heparin  Injectable 5000 Unit(s) SubCutaneous every 8 hours  hydrALAZINE 50 milliGRAM(s) Oral every 8 hours  isosorbide   dinitrate Tablet (ISORDIL) 20 milliGRAM(s) Oral every 8 hours  pantoprazole  Injectable 40 milliGRAM(s) IV Push daily  petrolatum Ophthalmic Ointment 1 Application(s) Both EYES every 6 hours  propofol Infusion 25 MICROgram(s)/kG/Min IV Continuous <Continuous>      ALLERGIES:  Allergy Status Unknown

## 2018-06-28 NOTE — AIRWAY REMOVAL NOTE  ADULT & PEDS - ARTIFICAL AIRWAY REMOVAL COMMENTS
Written order for extubation verified. The patient was identified by full name and birth date compared to the identification band. Present during the procedure was Thalia SAENZ

## 2018-06-29 NOTE — DISCHARGE NOTE FOR THE EXPIRED PATIENT - HOSPITAL COURSE
46 y/o PMH of Asthma, HTN, and hx of substance abuse including cocaine, alcohol, marijuana coming in after cardiac arrest. Per EMS patient had been released from halfway back in March and has not been compliant with  HTN meds. Patient was chocking ? vs. having SOB, he called EMS. When EMS arrived he collapsed and went into cardiac arrest (around 10:30pm), was intubated and ROSC was achieved by EMS after Epi x2 and bicarb x1  and he was transported to [facility]. BPs were found to be 200/120's, labetalol was given, BP continued to be elevated to 222/126  and he was placed on propofol drip, but that was discontinued, then was placed on nitroglycerin drip and bicarb drip. Patient was on a cooling protocol. Initial labs included lactate of 18, PCO2 of 145 and pH of <7. Patient also had episodes of desaturation on ventilation to 80s. Vent settings were PEEP 7, TV-500, RR-14, FiO2-100. Patient also with EKG changes with ST depression in lateral leads. At [facility] patient received , Brillinta 180mg, Heparin IVP 5000u, Lasix 80mg IVP. CTH was negative for acute bleed.   Patient was removed off propofol but patient had fixed gaze with no purposeful movement.  Patient was transferred to [facility] for further management of cardiac arrest.  EEG showed diffuse slow; CTH non-contrast showed diffuse anoxic brain injury.  Pt persistently febrile, neurogenic fever versus aspiration of tube feeds; Vancomycin and Zosyn initially given then cancelled given no clear cut target.  Pt eventually lost gag reflex and posturing movements.  Prior to expiration, pt went into acute renal failure and began to have serologic signs of liver failure as well as NSVT.  Pt made DNR by palliative care, and family requested elective extubation. Patient subsequently  at 4:14 AM on 18.  Autopsy and ME not requested.

## 2018-06-29 NOTE — CHART NOTE - NSCHARTNOTEFT_GEN_A_CORE
Called to patients room by RN for asystole on monitor. Upon entering the room, pt is unresponsive to verbal/ noxious stimuli. Pupils fixed and dilated, with no spontaneous breathing. No palpable carotid or radial pulse. No heart or breath sounds.     Time of death 4:14 AM    Family at bedside, declined autopsy.    Attending, Dr. Baeza, notified.

## 2018-07-02 LAB
CULTURE RESULTS: SIGNIFICANT CHANGE UP
CULTURE RESULTS: SIGNIFICANT CHANGE UP
SPECIMEN SOURCE: SIGNIFICANT CHANGE UP
SPECIMEN SOURCE: SIGNIFICANT CHANGE UP

## 2018-08-06 NOTE — CHART NOTE - NSCHARTNOTEFT_GEN_A_CORE
Patient arrived at [facility] on June 22, 2018 after cardiac arrest without central line access or arterial line.  Both central line access and an arterial line were placed in the CCU upon arrival.   The arterial line was placed in the left radial artery for the purposes of accurate real time blood pressure monitoring and frequent arterial lab draws (arterial blood gases).

## 2018-08-06 NOTE — CHART NOTE - NSCHARTNOTESELECT_GEN_ALL_CORE
CCU Midnight Rounds
CCU Midni Rounds/Event Note
CCU Midnight Rounds
Event Note
Event Note/Clinical Documentation Update
Event Note/midnight rounds
Expiration Note

## 2021-01-18 NOTE — CONSULT NOTE ADULT - ASSESSMENT
47M with PMH of asthma, HTN, substance abuse (cocaine, EtOH, marijuana) here after cardiac arrest, with PEA, now with confirmed anoxic brain injury.
46 y/o PMH of Asthma, HTN, and hx of substance abuse including cocaine, alcohol, marijuana coming in after witnessed cardiac arrest by EMS  transferred from [facility].  Neurological exam significant for no oculocephalic reflex, no corneal reflex, no gag/cough reflex, no spontaneous movement.  CTH pending read, appears to have loss of gray-white differentiation.    Plan:  - clinical presentation with guarded prognosis for meaningful neurological recovery, however will f/u  CTH read for further assessment
This patient was evaluated for sepsis.  At this time, a diagnosis of sepsis is not supported by the overall clinical picture.